# Patient Record
Sex: FEMALE | Race: WHITE | HISPANIC OR LATINO | URBAN - METROPOLITAN AREA
[De-identification: names, ages, dates, MRNs, and addresses within clinical notes are randomized per-mention and may not be internally consistent; named-entity substitution may affect disease eponyms.]

---

## 2017-09-19 ENCOUNTER — OFFICE VISIT - HEALTHEAST (OUTPATIENT)
Dept: SURGERY | Facility: CLINIC | Age: 27
End: 2017-09-19

## 2017-09-19 DIAGNOSIS — E66.01 MORBID OBESITY (H): ICD-10-CM

## 2017-09-19 DIAGNOSIS — E88.819 INSULIN RESISTANCE: ICD-10-CM

## 2017-09-19 DIAGNOSIS — K59.09 CHRONIC CONSTIPATION: ICD-10-CM

## 2017-09-19 DIAGNOSIS — L83 ACANTHOSIS NIGRICANS: ICD-10-CM

## 2017-09-19 DIAGNOSIS — M54.50 LOW BACK PAIN: ICD-10-CM

## 2017-09-19 DIAGNOSIS — G43.909 HEADACHE, MIGRAINE: ICD-10-CM

## 2017-09-19 DIAGNOSIS — E88.810 METABOLIC SYNDROME: ICD-10-CM

## 2017-09-19 DIAGNOSIS — Z83.3 FAMILY HISTORY OF DIABETES MELLITUS TYPE II: ICD-10-CM

## 2017-09-19 DIAGNOSIS — K21.9 GERD (GASTROESOPHAGEAL REFLUX DISEASE): ICD-10-CM

## 2017-09-19 DIAGNOSIS — K76.0 FATTY LIVER: ICD-10-CM

## 2017-09-19 DIAGNOSIS — R74.8 ELEVATED LIVER ENZYMES: ICD-10-CM

## 2017-09-19 DIAGNOSIS — E78.5 DYSLIPIDEMIA: ICD-10-CM

## 2017-09-19 ASSESSMENT — MIFFLIN-ST. JEOR: SCORE: 1527.35

## 2017-10-10 ENCOUNTER — COMMUNICATION - HEALTHEAST (OUTPATIENT)
Dept: SURGERY | Facility: CLINIC | Age: 27
End: 2017-10-10

## 2017-10-27 ENCOUNTER — OFFICE VISIT - HEALTHEAST (OUTPATIENT)
Dept: SURGERY | Facility: CLINIC | Age: 27
End: 2017-10-27

## 2017-10-27 DIAGNOSIS — E66.9 OBESITY (BMI 30-39.9): ICD-10-CM

## 2017-10-27 DIAGNOSIS — Z71.3 DIETARY COUNSELING: ICD-10-CM

## 2017-10-27 ASSESSMENT — MIFFLIN-ST. JEOR: SCORE: 1457.05

## 2017-11-28 ENCOUNTER — AMBULATORY - HEALTHEAST (OUTPATIENT)
Dept: LAB | Facility: CLINIC | Age: 27
End: 2017-11-28

## 2017-11-28 ENCOUNTER — OFFICE VISIT - HEALTHEAST (OUTPATIENT)
Dept: SURGERY | Facility: CLINIC | Age: 27
End: 2017-11-28

## 2017-11-28 DIAGNOSIS — K76.0 FATTY LIVER: ICD-10-CM

## 2017-11-28 DIAGNOSIS — E88.810 METABOLIC SYNDROME: ICD-10-CM

## 2017-11-28 DIAGNOSIS — E66.01 MORBID OBESITY (H): ICD-10-CM

## 2017-11-28 DIAGNOSIS — E88.819 INSULIN RESISTANCE: ICD-10-CM

## 2017-11-28 DIAGNOSIS — E78.5 DYSLIPIDEMIA: ICD-10-CM

## 2017-11-28 DIAGNOSIS — R74.8 ELEVATED LIVER ENZYMES: ICD-10-CM

## 2017-11-28 LAB
FASTING STATUS PATIENT QL REPORTED: NO
HDLC SERPL-MCNC: 42 MG/DL
LDLC SERPL CALC-MCNC: 154 MG/DL

## 2017-11-28 ASSESSMENT — MIFFLIN-ST. JEOR: SCORE: 1425.29

## 2017-11-29 LAB — HBA1C MFR BLD: 5.3 % (ref 4.2–6.1)

## 2017-11-30 ENCOUNTER — AMBULATORY - HEALTHEAST (OUTPATIENT)
Dept: SURGERY | Facility: CLINIC | Age: 27
End: 2017-11-30

## 2017-11-30 ENCOUNTER — COMMUNICATION - HEALTHEAST (OUTPATIENT)
Dept: SURGERY | Facility: CLINIC | Age: 27
End: 2017-11-30

## 2017-11-30 DIAGNOSIS — E55.9 VITAMIN D DEFICIENCY: ICD-10-CM

## 2017-11-30 DIAGNOSIS — E21.3 HYPERPARATHYROIDISM (H): ICD-10-CM

## 2018-01-05 ENCOUNTER — COMMUNICATION - HEALTHEAST (OUTPATIENT)
Dept: SURGERY | Facility: CLINIC | Age: 28
End: 2018-01-05

## 2018-05-25 ENCOUNTER — COMMUNICATION - HEALTHEAST (OUTPATIENT)
Dept: SURGERY | Facility: CLINIC | Age: 28
End: 2018-05-25

## 2018-05-25 ENCOUNTER — AMBULATORY - HEALTHEAST (OUTPATIENT)
Dept: SURGERY | Facility: CLINIC | Age: 28
End: 2018-05-25

## 2018-05-25 DIAGNOSIS — E78.5 DYSLIPIDEMIA (HIGH LDL; LOW HDL): ICD-10-CM

## 2018-05-25 DIAGNOSIS — E55.9 VITAMIN D DEFICIENCY: ICD-10-CM

## 2018-05-25 DIAGNOSIS — E21.3 HYPERPARATHYROIDISM (H): ICD-10-CM

## 2018-06-28 ENCOUNTER — OFFICE VISIT - HEALTHEAST (OUTPATIENT)
Dept: SURGERY | Facility: CLINIC | Age: 28
End: 2018-06-28

## 2018-06-28 DIAGNOSIS — E66.3 OVERWEIGHT: ICD-10-CM

## 2018-06-28 DIAGNOSIS — E66.01 MORBID OBESITY (H): ICD-10-CM

## 2018-06-28 DIAGNOSIS — E88.819 INSULIN RESISTANCE: ICD-10-CM

## 2018-06-28 DIAGNOSIS — E88.810 METABOLIC SYNDROME: ICD-10-CM

## 2018-06-28 DIAGNOSIS — K76.0 FATTY LIVER: ICD-10-CM

## 2018-06-28 ASSESSMENT — MIFFLIN-ST. JEOR: SCORE: 1302.82

## 2020-08-05 ENCOUNTER — OFFICE VISIT - HEALTHEAST (OUTPATIENT)
Dept: SURGERY | Facility: CLINIC | Age: 30
End: 2020-08-05

## 2020-08-05 DIAGNOSIS — E66.01 MORBID OBESITY WITH BMI OF 40.0-44.9, ADULT (H): ICD-10-CM

## 2020-08-05 DIAGNOSIS — E66.3 OVERWEIGHT: ICD-10-CM

## 2020-08-05 DIAGNOSIS — K76.0 FATTY LIVER: ICD-10-CM

## 2020-08-05 DIAGNOSIS — E88.819 INSULIN RESISTANCE: ICD-10-CM

## 2020-08-05 DIAGNOSIS — E88.810 METABOLIC SYNDROME: ICD-10-CM

## 2020-08-05 ASSESSMENT — MIFFLIN-ST. JEOR: SCORE: 1584.05

## 2020-08-05 ASSESSMENT — PATIENT HEALTH QUESTIONNAIRE - PHQ9: SUM OF ALL RESPONSES TO PHQ QUESTIONS 1-9: 9

## 2020-08-06 ENCOUNTER — OFFICE VISIT - HEALTHEAST (OUTPATIENT)
Dept: SURGERY | Facility: CLINIC | Age: 30
End: 2020-08-06

## 2020-08-06 DIAGNOSIS — E66.01 OBESITY, CLASS III, BMI 40-49.9 (MORBID OBESITY) (H): ICD-10-CM

## 2020-08-06 DIAGNOSIS — K76.0 FATTY LIVER: ICD-10-CM

## 2020-08-06 DIAGNOSIS — Z71.3 NUTRITIONAL COUNSELING: ICD-10-CM

## 2020-08-06 ASSESSMENT — MIFFLIN-ST. JEOR: SCORE: 1584.05

## 2020-09-09 ENCOUNTER — COMMUNICATION - HEALTHEAST (OUTPATIENT)
Dept: SURGERY | Facility: CLINIC | Age: 30
End: 2020-09-09

## 2021-01-04 ENCOUNTER — HEALTH MAINTENANCE LETTER (OUTPATIENT)
Age: 31
End: 2021-01-04

## 2021-02-05 ENCOUNTER — OFFICE VISIT - HEALTHEAST (OUTPATIENT)
Dept: SURGERY | Facility: CLINIC | Age: 31
End: 2021-02-05

## 2021-02-05 ENCOUNTER — COMMUNICATION - HEALTHEAST (OUTPATIENT)
Dept: SURGERY | Facility: CLINIC | Age: 31
End: 2021-02-05

## 2021-02-05 DIAGNOSIS — E88.819 INSULIN RESISTANCE: ICD-10-CM

## 2021-02-05 DIAGNOSIS — E88.810 METABOLIC SYNDROME: ICD-10-CM

## 2021-02-05 DIAGNOSIS — E66.3 OVERWEIGHT: ICD-10-CM

## 2021-02-05 DIAGNOSIS — E66.01 MORBID OBESITY (H): ICD-10-CM

## 2021-02-05 RX ORDER — PHENTERMINE HYDROCHLORIDE 37.5 MG/1
TABLET ORAL
Qty: 90 TABLET | Refills: 1 | Status: SHIPPED | OUTPATIENT
Start: 2021-02-05 | End: 2022-06-22

## 2021-02-05 ASSESSMENT — MIFFLIN-ST. JEOR: SCORE: 1511.48

## 2021-02-09 ENCOUNTER — COMMUNICATION - HEALTHEAST (OUTPATIENT)
Dept: SURGERY | Facility: CLINIC | Age: 31
End: 2021-02-09

## 2021-02-09 ENCOUNTER — OFFICE VISIT - HEALTHEAST (OUTPATIENT)
Dept: SURGERY | Facility: CLINIC | Age: 31
End: 2021-02-09

## 2021-02-09 DIAGNOSIS — E66.812 OBESITY, CLASS II, BMI 35-39.9, ISOLATED (SEE ACTUAL BMI): ICD-10-CM

## 2021-02-09 DIAGNOSIS — K76.0 FATTY LIVER: ICD-10-CM

## 2021-02-09 DIAGNOSIS — Z71.3 NUTRITIONAL COUNSELING: ICD-10-CM

## 2021-05-27 ASSESSMENT — PATIENT HEALTH QUESTIONNAIRE - PHQ9: SUM OF ALL RESPONSES TO PHQ QUESTIONS 1-9: 9

## 2021-05-30 ENCOUNTER — RECORDS - HEALTHEAST (OUTPATIENT)
Dept: ADMINISTRATIVE | Facility: CLINIC | Age: 31
End: 2021-05-30

## 2021-05-31 VITALS — HEIGHT: 60 IN | WEIGHT: 197.5 LBS | BODY MASS INDEX: 38.77 KG/M2

## 2021-05-31 VITALS — WEIGHT: 175 LBS | BODY MASS INDEX: 34.36 KG/M2 | HEIGHT: 60 IN

## 2021-05-31 VITALS — WEIGHT: 182 LBS | HEIGHT: 60 IN | BODY MASS INDEX: 35.73 KG/M2

## 2021-06-01 VITALS — HEIGHT: 60 IN | WEIGHT: 148 LBS | BODY MASS INDEX: 29.06 KG/M2

## 2021-06-04 VITALS — BODY MASS INDEX: 41.23 KG/M2 | HEIGHT: 60 IN | WEIGHT: 210 LBS

## 2021-06-04 VITALS — BODY MASS INDEX: 41.23 KG/M2 | WEIGHT: 210 LBS | HEIGHT: 60 IN

## 2021-06-05 VITALS — BODY MASS INDEX: 38.09 KG/M2 | HEIGHT: 60 IN | WEIGHT: 194 LBS

## 2021-06-10 NOTE — PROGRESS NOTES
"Viviana Landin is a 29 y.o. female who is being evaluated via a billable video visit.      The patient has been notified of following:     \"This video visit will be conducted via a call between you and your physician/provider. We have found that certain health care needs can be provided without the need for an in-person physical exam.  This service lets us provide the care you need with a video conversation.  If a prescription is necessary we can send it directly to your pharmacy.  If lab work is needed we can place an order for that and you can then stop by our lab to have the test done at a later time.    Video visits are billed at different rates depending on your insurance coverage. Please reach out to your insurance provider with any questions.    If during the course of the call the physician/provider feels a video visit is not appropriate, you will not be charged for this service.\"    Patient has given verbal consent to a Video visit? Yes  How would you like to obtain your AVS? AVS Preference: MyChart.  If dropped by the video visit, the video invitation should be sent to: Text to cell phone: [phone]  Will anyone else be joining your video visit? No        Video Start Time: 10:45    Additional provider notes:   Bariatric Follow-up    29 y.o.  female BMI:Body mass index is 41.01 kg/m .    Weight:   Wt Readings from Last 1 Encounters:   08/05/20 210 lb (95.3 kg)    pounds  Height: 5' (1.524 m) (8/5/2020 11:00 AM)  Weight: 210 lb (95.3 kg) (8/5/2020 11:00 AM)  BMI (Calculated): 41 (8/5/2020 11:00 AM)      Comorbidities:  Patient Active Problem List   Diagnosis     Migraine Headache     Midback Pain     Fainting (Syncope)     Mood Disorder Of Unknown (Axis III) Etiology     Low back pain     Fatty liver     Dyslipidemia     Elevated liver enzymes     Headache, migraine     Chronic constipation     Metabolic syndrome     Insulin resistance     Acanthosis nigricans     Family history of diabetes " mellitus type II         Interim: Life has been hectic. In 2017 her LDL was high, PTH also high    Plan:  DIET  Will work toward structured meals with meal planning and healthier foods to limit eating out to 2X/wk or less   EXERCISE continue walking 30 minutes daily   PHARMACOTHERAPY restart phentermine and metformin    dietitian visit. Will defer repeat labs. Discuss further next visit. We know LDL was high and D was low     -We reviewed her medications and whether associated with weight gain.  Current Outpatient Medications on File Prior to Visit   Medication Sig Dispense Refill     aspirin-acetaminophen-caffeine (EXCEDRIN MIGRAINE) 250-250-65 mg per tablet Take 1 tablet by mouth every 6 (six) hours as needed for pain.       etonogestreL (NEXPLANON) 68 mg Impl implant Inject 68 mg under the skin.       [DISCONTINUED] phentermine (ADIPEX-P) 37.5 mg tablet Take 1/2 to 1 tablet in the morning. 90 tablet 1     [DISCONTINUED] metFORMIN (GLUCOPHAGE) 500 MG tablet Take 1 tablet (500 mg total) by mouth 2 (two) times a day with meals. 180 tablet prn     No current facility-administered medications on file prior to visit.         We discussed [facility] Bariatric Basics including:  -eating 3 meals daily  -eating protein first  -eating slowly, chewing food well  -avoiding/limiting calorie containing beverages  -choosing wheat, not white with breads, crackers, pastas, jhony, bagels, tortillas, rice  -limiting restaurant or cafeteria eating to twice a week or less  -We discussed the importance of restorative sleep and stress management in maintaining a healthy weight.  -We discussed insulin resistance and glycemic index as it relates to appetite and weight control  -We discussed the National Weight Control Registry healthy weight maintenance strategies and ways to optimize metabolism.  -We discussed the importance of physical activity including cardiovascular and strength training in maintaining a healthier weight and  "explored viable options.    Most recent labs:  Lab Results   Component Value Date    WBC 9.1 11/28/2017    HGB 13.0 11/28/2017    HCT 39.1 11/28/2017    MCV 84 11/28/2017     11/28/2017     Lab Results   Component Value Date    CHOL 226 (H) 02/28/2014     Lab Results   Component Value Date    HDL 42 (L) 11/28/2017     Lab Results   Component Value Date    LDLCALC 149 (H) 02/28/2014     Lab Results   Component Value Date    TRIG 129 02/28/2014     Lab Results   Component Value Date    ALT 28 11/28/2017    AST 19 11/28/2017    ALKPHOS 50 11/28/2017    BILITOT 0.9 11/28/2017     Lab Results   Component Value Date    HGBA1C 5.3 11/28/2017     Lab Results   Component Value Date    SDULGPDP73 526 11/28/2017     Lab Results   Component Value Date    QGGNHWYQ20UN 22.0 (L) 11/28/2017     Lab Results   Component Value Date    FERRITIN 23 11/28/2017     Lab Results   Component Value Date     (H) 11/28/2017     No results found for: 78349  No results found for: 7597  Lab Results   Component Value Date    TSH 0.72 11/28/2017         DIETARY HISTORY  Meals Per Day: 1-2  Eating Protein First?: no  Food Diary: B:coffee with cream L: at her mom's tacos or whatever she cooks, \"nothing good\" D:fast food  Snacks Per Day: a whole lot of snacking  Typical Snack: popcorn, chips, pretzels, cookies  Fluid Intake: coffee and water  Portion Control: problematic  Calorie Containing Beverages: no  Alcohol per week: no  Typical Protein Food Choices: variety  Choosing Whole Grains: no  Grocery Shopping is done by: herself  Food Preparation is done by: herself  Meals at Restaurant/Cafeteria/Take Out Per Week: 15  Eating at the Table: no  TV is Off During Meals: no    Positive Changes Since Last Visit: ready to get back on track  Struggling With: structured meals, food choices, eating out a lot, high stress with son and COVID    Knowledgeable in Reading Food Labels: no  Getting Adequate Protein: likely  Sleeping 7-8 hours/day " yes  Stress management getting outside now    PHYSICAL ACTIVITY PATTERNS:  Cardiovascular: walking 30 minutes most days  Strength Training: no    REVIEW OF SYSTEMS  GENERAL/CONSTITUTIONAL:  Fatigue: yes  CARDIOVASCULAR:  Chest Pain with Exertion: no  PULMONARY:  Dyspnea on exertion: yes  CPAP Use: NA  Tobacco Use: no  GASTROINTESTINAL:  GERD/Heartburn: yes  UROLOGIC:  Urinary Symptoms: no  NEUROLOGIC:  Headaches: no  Paresthesias: no  PSYCHIATRIC:  Moods: up and down  MUSCULOSKELETAL/RHEUMATOLOGIC  Arthralgias: yes  Myalgias: yes  ENDOCRINE:  Monitoring Blood Sugars: no  Sugars Well Controlled: yes  DERMATOLOGIC:  Rashes: no    PHYSICAL EXAM: (most recent vitals and today's stated weight)  Vitals: Ht 5' (1.524 m)   Wt 210 lb (95.3 kg)   BMI 41.01 kg/m    Height: 5' (1.524 m) (8/5/2020 11:00 AM)  Weight: 210 lb (95.3 kg) (8/5/2020 11:00 AM)  BMI (Calculated): 41 (8/5/2020 11:00 AM)      GEN: Pleasant and in no acute distress  PSYCH: A&OX3,     I have reviewed the note as documented above.  This accurately captures the substance of my conversation with the patient.  Thank you for the opportunity to participate in the care of your patient.    Martine Childers MD, FAAFP  [facility]  Diplomate, American Board of Obesity Medicine        Video-Visit Details    Type of service:  Video Visit    Video End Time (time video stopped): 11:23 AM  Originating Location (pt. Location): Home    Distant Location (provider location):  [facility] SURG[facility] C[facility]     Platform used for Video Visit: [facility]      Martine Childers MD

## 2021-06-11 NOTE — TELEPHONE ENCOUNTER
Attempted to reach patient for video visit at 10:30 by sending video link via text and calling. LVM to call back to reschedule at earliest convenience.

## 2021-06-13 NOTE — PROGRESS NOTES
"Non-surgical Weight Loss Initial Diet Evaluation     Assessment:  Pt is a 27 y.o. female being seen today for non-surgical RD nutritional evaluation. Today we reviewed current eating habits and level of physical activity, and instructed on the changes that are required for successful weight loss outcomes.    Personal Goals: Pt would like to lose weight and feel better while learning more about nutrition     Phentermine/Metformin: 1tab/2tabs  +discussed BS management throughout the day and its role in weight loss    Pt's Initial Weight: 197.5 lbs  Weight: 182 lb (82.6 kg)  Weight loss from initial: 15.5  % Weight loss: 7.85 %  BMI: Body mass index is 35.54 kg/(m^2).  IBW: 100 lbs    Estimated RMR (Suffield-St Jeor equation): 1474 calories  Protein requirements (.5grams to .9grams per pound IBW, 20-30% of calories, minimum of 60-80gm per day):  50-90 grams     Food allergies, intolerances, Catholic customs: none    Vitamins/Mineral Supplementation: none    -lost 20lbs with diet and exercise   Biggest struggle with weight loss: \"self control\" - emotional/stress eating - pt states she is doing less of this while on the meds    Who does the grocery shopping for your household? Self and and BF  Who prepares your meals at home? Self and Bf  -lives with BF and 3 children (11, 9, 1)    ++Pt has made a lot of changes to her nutrition NO longer drinking sugar coffee or soda, smaller meal sizes and more protein  Diet Recall/Time: wakes 5am  Breakfast: 7/730 - shake and fruit (greek yogurt, pako and fruit) (9g)   Am Snack: none  Lunch: 12pm - Pro/Veg/CHO - leftovers; OR salad w/ protein OR wendys chicken valarie and fries  Pm snack: P3 (15g)   Dinner: Pro/Veg/CHO   HS Snack: none    Typical Snacks: P3  -more fast food for lunch, vending machine cookies/cupcakes for bfast, larger portions (pizza pasta at dinner) : drinking soda daily     Recommended limiting eating out to no more than 2x/week.  Patient and I reviewed the importance " of eating three consistent meals per day; as well as meal timing to be spaced 4-5 hours apart.  Snack choices: 100-150 calories (1-2x/day if physically hungry), incorporating a fruit/vegetable w/ protein source.    Portion Sizes problematic? yes per patient/diet recall  Encouraged slowing meal times down, 20-30 minutes, chewing to applesauce consistency.   To aid in proper portion control and slow meal time down discussed consuming meals off smaller plates, use toddler/children utensils and set utensils down after each bite.    Protein, vegetables/fruits, carbohydrates:   Reviewed lean protein sources today. Recommended consuming 20-25gm protein at 3 meals daily.  The patient and I discussed the importance of including lean/low fat protein at each meal and limiting carbohydrate intake to less than 25% of plate volume.     Beverages (Type/Oz. per day)  Water: 60oz  Coffee: previously was drinking sugar coffee  Tea: none  Milk: occasional  Regular soda: previously was drinking 2 cans/day  Diet soda: none  Juice: none  Sumeet-Aid/lemonade/etc: none  Alcohol: none    Discussed the importance of adequate hydration and the goal of 64+ oz of fluid daily.   The patient understands the importance of avoiding all alcoholic and sweetened drinks, and instead choosing 64 oz plain water.    Exercise  Just started walking since visiting the dr for 60minutes  Hit and miss with the gym     Pt's understands that 45-60 minutes of daily activity is an important part of weight loss success.   Encouraged pt to incorporate upper body strength training exercise, even if its lifting soup cans while watching tv at night, doing push ups/sit-ups, and abdominal work.    PES statement:    1. (NI-1.3)Excessive energy intake related to Food and nutrition related knowledge deficit concerning excessive energy/oral intake as evidenced by Intake of high caloric density foods at meals and/or snacks; large portions; frequent grazing; Estimated intake that  exceeds estimated daily energy intake; Frequent excessive fast food or restaurant intake; and BMI 35.54    2. (NC-3.3.5) Obese, class III, BMI ?40 related to physical inactivity as evidenced by Infrequent, low-duration and or low intensity physical activity; and Large amounts of sedentary activities; no structured physical activity regimen     Intervention  Discussion:  1. Educated pt on Eat Better, Move More, Live Well: Non-surgical Weight Loss Handout  2. Recommended to consume 15-20gm protein at 3 meals daily. 50-90 grams daily total.  3. Educated pt on food labels: keeping total fat grams <10, total sugar grams <10, fiber >3gm per serving.   4. Reviewed Plate Method and gave food journal homework.  5. Gave food journal homework, to be completed for f/u appointment.  6. Plate Method: The patient and I discussed the importance of including lean/low  fat protein at each meal and limiting carbohydrate intake to less  than 25% of plate volume.  Instructions/Goals:   1. Include 15-20gm protein at each meal.  2. Increase vegetable/fruit intake, by having a vegetable or fruit with each meal daily. Recommended pt to increase vegetable/fruit intake to 4-5 servings daily.  3. Increase fluid intake to 64oz daily: choose plain or calorie/alcohol-free beverages.  4. Incorporate daily structured activity, 45-60 minutes most days of the week  5. Read food labels more consistently: keeping total fat grams <10, total sugar grams <10, fiber >3gm per serving.  6. Practice plate method: 1/2 plate lean/low fat protein source, vegetable/fruit, <25% of plate complex carbohydrates.  7. Practice eating off of smaller plates/bowls, chewing to applesauce consistency, taking 20-30 minutes to eat in a calm/relaxed environment without distractions of tv/email/cell phone.    Handouts Provided:  Eat Better, Move More, Live Well: Non-surgical Weight Loss Handout    Monitor/Evaluation:    Pt will f/u in one month with bariatrician, and f/u in two  months with RD.    Plan for next visit with RD:  GOALS:   1) Focus on protein at meal (15-20g)     Time In: 1:30p  Time Out: 2:15p      ABN signed: Yes
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
## 2021-06-14 NOTE — PROGRESS NOTES
Here for f/u non-surgical weight loss.  Saw the dietitian last month and is taking phentermine as well as Metformin.  See flowsheet.    Jyoti Marina RN, CBN  [facility]  P [phone]  F [phone]

## 2021-06-14 NOTE — PROGRESS NOTES
Bariatric Follow-up    27 y.o.  female BMI:Body mass index is 34.18 kg/(m^2).    Weight:   Wt Readings from Last 1 Encounters:   11/28/17 175 lb (79.4 kg)    pounds  Height: 5' (1.524 m) (11/28/2017  1:44 PM)  Initial Weight: 197.5 lbs (11/28/2017  1:44 PM)  Weight: 175 lb (79.4 kg) (11/28/2017  1:44 PM)  Weight loss from initial: 22.5 (11/28/2017  1:44 PM)  % Weight loss: 11.39 % (11/28/2017  1:44 PM)  BMI (Calculated): 34.2 (11/28/2017  1:44 PM)  SpO2: 97 % (9/19/2017 12:39 PM)  Waist Circumference (In): 42 Inches (9/19/2017 12:39 PM)  Hip Circumference (In): 44 Inches (9/19/2017 12:39 PM)  Neck Circumference (In): 14.5 Inches (9/19/2017 12:39 PM)  NSAIDS: Yes (11/28/2017  1:44 PM)  Pain Scale: 0 (11/28/2017  1:44 PM)    Comorbidities:  Patient Active Problem List   Diagnosis     Obesity     Migraine Headache     Midback Pain     Fainting (Syncope)     Mood Disorder Of Unknown (Axis III) Etiology     Low back pain     Fatty liver     Dyslipidemia     Morbid obesity     Elevated liver enzymes     Headache, migraine     GERD (gastroesophageal reflux disease)     Chronic constipation     Metabolic syndrome     Insulin resistance     Acanthosis nigricans     Family history of diabetes mellitus type II     Vitamin D deficiency     Hyperparathyroidism     Interim: Doing well. Has lost 22# in 2 months through multiple positive lifestyle adjustments.Needs a refill. Didn't have labs drawn yet.    Plan: Refill phentermine. Continue healthy habits. Labs today. Weekly weights. Consistent follow up at intervals to best keep her on track.   -We reviewed her medications and whether associated with weight gain.    We discussed [facility] Bariatric Basics including:  -eating 3 meals daily  -eating protein first  -eating slowly, chewing food well  -avoiding/limiting calorie containing beverages  -choosing wheat, not white with breads, crackers, pastas, jhony, bagels, tortillas, rice  -limiting restaurant or cafeteria eating to twice  a week or less  -We discussed the importance of restorative sleep and stress management in maintaining a healthy weight.  -We discussed insulin resistance and glycemic index as it relates to appetite and weight control  -We discussed the National Weight Control Registry healthy weight maintenance strategies and ways to optimize metabolism.  -We discussed the importance of physical activity including cardiovascular and strength training in maintaining a healthier weight and explored viable options.    Most recent labs:  Lab Results   Component Value Date    WBC 9.1 11/28/2017    HGB 13.0 11/28/2017    HCT 39.1 11/28/2017    MCV 84 11/28/2017     11/28/2017     Lab Results   Component Value Date    CHOL 226 (H) 02/28/2014     Lab Results   Component Value Date    HDL 42 (L) 11/28/2017     Lab Results   Component Value Date    LDLCALC 149 (H) 02/28/2014     Lab Results   Component Value Date    TRIG 129 02/28/2014       Lab Results   Component Value Date    TSH 0.72 11/28/2017       DIETARY HISTORY  Meals Per Day: 2-3  Eating Protein First?: yes  Food Diary: B:eggs boiled or scrambled or smoothies L:salads, soups, [employer], occ D:lasagne  Snacks Per Day: rare  Typical Snack: string cheese, eggs, protein packs  Fluid Intake: intentional  Portion Control: improved  Calorie Containing Beverages: none  Alcohol per week: none  Typical Protein Food Choices: cheese, eggs, chicken, fish, beans, shakes with protein powder  Choosing Whole Grains: yes tries most of the time  Grocery Shopping is done by: she does  Food Preparation is done by: she does  Meals at Restaurant/Cafeteria/Take Out Per Week: 2-3X  Eating at the Table: yes  TV is Off During Meals: yes    Positive Changes Since Last Visit: she has lost 22#  Struggling With: strength training    Knowledgeable in Reading Food Labels: yes  Getting Adequate Protein: yes  Sleeping 7-8 hours/day yes  Stress management OK    PHYSICAL ACTIVITY PATTERNS:  Cardiovascular: walking  every day-1 hour a day  Strength Training: not yet-has a gym membership.     REVIEW OF SYSTEMS  GENERAL/CONSTITUTIONAL:  Fatigue: yes  CARDIOVASCULAR:  Chest Pain with Exertion: no  PULMONARY:  Dyspnea on exertion: yes  CPAP Use: no  Tobacco Use: no  Asthma Controlled: NA  GASTROINTESTINAL:  GERD/Heartburn: yes  Gallbladder:   UROLOGIC:  Urinary Symptoms: USI  NEUROLOGIC:  Headaches: yes  Paresthesias: no  PSYCHIATRIC:  Moods: OK  MUSCULOSKELETAL/RHEUMATOLOGIC  Arthralgias: LBP  Myalgias: yes  ENDOCRINE:  Monitoring Blood Sugars: no  Sugars Well Controlled: yes  DERMATOLOGIC:  Rashes: acanthosis, skin tags    PHYSICAL EXAM:  Vitals: /61  Pulse 71  Resp 16  Ht 5' (1.524 m)  Wt 175 lb (79.4 kg)  BMI 34.18 kg/m2  Height: 5' (1.524 m) (11/28/2017  1:44 PM)  Initial Weight: 197.5 lbs (11/28/2017  1:44 PM)  Weight: 175 lb (79.4 kg) (11/28/2017  1:44 PM)  Weight loss from initial: 22.5 (11/28/2017  1:44 PM)  % Weight loss: 11.39 % (11/28/2017  1:44 PM)  BMI (Calculated): 34.2 (11/28/2017  1:44 PM)  SpO2: 97 % (9/19/2017 12:39 PM)  Waist Circumference (In): 42 Inches (9/19/2017 12:39 PM)  Hip Circumference (In): 44 Inches (9/19/2017 12:39 PM)  Neck Circumference (In): 14.5 Inches (9/19/2017 12:39 PM)  NSAIDS: Yes (11/28/2017  1:44 PM)  Pain Scale: 0 (11/28/2017  1:44 PM)    GEN: Pleasant, well groomed, in no acute disress  EYES: EOMI,  ENT: airway patent  NECK: no carotid bruits, no anterior/supraclavicular lymphadenopathy, thyroid normal   HEART: Rhythm regular, rate regular, no murmur   LUNGS: Clear  ABDOMEN: soft, non-tender, obese, no rashes   VASCULAR: trace bilateral  lower extremity edema  MUSCULOSKELETAL:  muscle mass OK for age  SKIN:  no color changes of venous stasis, no ulcerations    Time spent with patients 30 minutes, >50% in counseling and coordination of care.

## 2021-06-15 NOTE — PROGRESS NOTES
Viviana Landin is a 30 y.o. female who is being evaluated via a billable video visit.       How would you like to obtain your AVS? MyChart.  If dropped from the video visit, the video invitation should be resent by: Send to e-mail at: [email or web address]  Will anyone else be joining your video visit? No     Video Start Time: 3:13 PM      Medical  Weight Loss Follow-Up Diet Evaluation  Assessment:  Viviana is presenting today for a follow up weight management nutrition consultation. Pt has had an initial appointment with Dr. Childers  Weight loss medication: Phentermine and metformin  Pt's Initial Weight: 210 lbs  Weight: 194 lb (88 kg)  Weight loss from initial: 16  % Weight loss: 7.62 %  Weight (Patient Reported): 194 lb (88 kg)  Height (Patient Reported): 5' (1.524 m)  BMI (Based on Pt Reported Ht/Wt): 37.89    BMI: 37.89  Ideal body weight: 45.5 kg (100 lb 4.9 oz)  Adjusted ideal body weight: 62.5 kg (137 lb 12.6 oz)    Estimated RMR (Pope-St Jeor equation):   1500 kcals   Recommended Protein Intake: 60-80 grams of protein/day  Patient Active Problem List:  Patient Active Problem List   Diagnosis     Migraine Headache     Midback Pain     Fainting (Syncope)     Mood Disorder Of Unknown (Axis III) Etiology     Low back pain     Fatty liver     Dyslipidemia     Elevated liver enzymes     Headache, migraine     Chronic constipation     Metabolic syndrome     Insulin resistance     Acanthosis nigricans     Family history of diabetes mellitus type II     Symptomatic cholelithiasis     Obesity (BMI 35.0-39.9) with comorbidity (H)     Progress on goals from last visit: struggling with emotional eating, not as much weight loss as she would have liked. Considering topamax for pop drinking.   +Main issue is finding the motivation to meal prep- hates shopping and cooking.    Nutrition Diagnosis:    Overweight/Obesity (NC 3.3) related to overeating and poor lifestyle habits as evidenced by patient report of  frequent fast food, grazing throughout the day, lack of activity, high calorie beverages and BMI 37.89  Intervention:  1. Nutrition education: discussed various meal planning techniques such as 10 meal rotational menu, theme nights (sent along recipe resources). Also discussed ways to alleviate anxiety when shopping such as shopping online at places like [employer] and picking up the groceries.   2. Nutrition counseling: motivational interviewing    Monitoring/Evaluation:    Goals:  1. Take a look at other ways to meal plan  2. Eliminate soda    Patient to follow up in 1 months(s) with bariatrician    Video-Visit Details    Type of service:  Video Visit    Video End Time (time video stopped): 3:30p  Originating Location (pt. Location): Home    Distant Location (provider location):  [facility] [place]     Platform used for Video Visit: [employer]

## 2021-06-15 NOTE — PROGRESS NOTES
Viviana Landin is 30 y.o.  female who presents for a billable video visit today.    How would you like to obtain your AVS? MyChart.  If dropped from the video visit, the video invitation should be resent by: Send text to [phone]    Will anyone else be joining your video visit? No      Video Start Time: 10:30 AM    Bariatric Follow-up    30 y.o.  female BMI:Body mass index is 37.89 kg/m .    Weight:   Wt Readings from Last 1 Encounters:   02/05/21 194 lb (88 kg)    pounds  Height: 5' (1.524 m) (2/5/2021 10:00 AM)  Initial Weight: 210 lbs (2/5/2021 10:00 AM)  Weight: 194 lb (88 kg) (2/5/2021 10:00 AM)  Weight loss from initial: 16 (2/5/2021 10:00 AM)  % Weight loss: 7.62 % (2/5/2021 10:00 AM)  BMI (Calculated): 37.9 (2/5/2021 10:00 AM)      Comorbidities:  Patient Active Problem List   Diagnosis     Migraine Headache     Midback Pain     Fainting (Syncope)     Mood Disorder Of Unknown (Axis III) Etiology     Low back pain     Fatty liver     Dyslipidemia     Elevated liver enzymes     Headache, migraine     Chronic constipation     Metabolic syndrome     Insulin resistance     Acanthosis nigricans     Family history of diabetes mellitus type II     Symptomatic cholelithiasis         Interim: 16# down since last visit. She is a little disappointed as she had lost 40# before     Plan:  DIET  Continue to work with the dietitian   EXERCISE youtube videos this week, continue to monitor steps   PHARMACOTHERAPY refill phentermine, continue metformin. Topamax may help get rid of soda al together. Restart vitamin D for increased PTH.     f/u with dietitian, stay the course     -We reviewed her medications and whether associated with weight gain.  Current Outpatient Medications on File Prior to Visit   Medication Sig Dispense Refill     aspirin-acetaminophen-caffeine (EXCEDRIN MIGRAINE) 250-250-65 mg per tablet Take 1 tablet by mouth every 6 (six) hours as needed for pain.       etonogestreL (NEXPLANON) 68  mg Impl implant Inject 68 mg under the skin.       metFORMIN (GLUCOPHAGE) 500 MG tablet Take 1 tablet (500 mg total) by mouth 2 (two) times a day with meals. 180 tablet prn     phentermine (ADIPEX-P) 37.5 mg tablet Take 1/2 to 1 tablet in the morning. 90 tablet 1     No current facility-administered medications on file prior to visit.         We discussed [facility] Bariatric Basics including:  -eating 3 meals daily  -eating protein first  -eating slowly, chewing food well  -avoiding/limiting calorie containing beverages  -choosing wheat, not white with breads, crackers, pastas, jhony, bagels, tortillas, rice  -limiting restaurant or cafeteria eating to twice a week or less  -We discussed the importance of restorative sleep and stress management in maintaining a healthy weight.  -We discussed insulin resistance and glycemic index as it relates to appetite and weight control  -We discussed the National Weight Control Registry healthy weight maintenance strategies and ways to optimize metabolism.  -We discussed the importance of physical activity including cardiovascular and strength training in maintaining a healthier weight and explored viable options.    Most recent labs:  Lab Results   Component Value Date    WBC 9.1 11/28/2017    HGB 13.0 11/28/2017    HCT 39.1 11/28/2017    MCV 84 11/28/2017     11/28/2017     Lab Results   Component Value Date    CHOL 226 (H) 02/28/2014     Lab Results   Component Value Date    HDL 42 (L) 11/28/2017     Lab Results   Component Value Date    LDLCALC 149 (H) 02/28/2014     Lab Results   Component Value Date    TRIG 129 02/28/2014     Lab Results   Component Value Date    ALT 28 11/28/2017    AST 19 11/28/2017    ALKPHOS 50 11/28/2017    BILITOT 0.9 11/28/2017     Lab Results   Component Value Date    HGBA1C 5.3 11/28/2017     Lab Results   Component Value Date    TMPXSPDV04 526 11/28/2017     Lab Results   Component Value Date    ADLDBZUS64ZN 22.0 (L) 11/28/2017     Lab  "Results   Component Value Date    FERRITIN 23 11/28/2017     Lab Results   Component Value Date     (H) 11/28/2017       Lab Results   Component Value Date    TSH 0.72 11/28/2017         DIETARY HISTORY  Meals Per Day: 3  Eating Protein First?: yes  Food Diary: B:eggs, veggie patties 2 eggs water L:salmon, asparagus, broccoli D:grilled chicken, rice, broccoli  Snacks Per Day: sometimes  Typical Snack: almonds or gold fish and pretzels, turkey with ritz  Fluid Intake: intentional 2 bottles of water in the morning  Portion Control: improved  Calorie Containing Beverages: regular coke or Dr. Pepper  Alcohol per week: none  Typical Protein Food Choices: variety  Choosing Whole Grains: yes  Grocery Shopping is done by: she does or her mom  Food Preparation is done by: she does or her mom  Meals at Restaurant/Cafeteria/Take Out Per Week: 2-3X/wk  Eating at the Table: sometimes  TV is Off During Meals:     Positive Changes Since Last Visit: less pop, less snacking, more water  Struggling With: eating veggies,     Knowledgeable in Reading Food Labels: yes  Getting Adequate Protein: yes  Sleeping 7-8 hours/day well, 7-8 hours  Stress management high \"insame\" COVID and stress, autistic son, bills, life    PHYSICAL ACTIVITY PATTERNS:  Cardiovascular: walking, Youtube videos for at home workouts  Strength Training: videos    REVIEW OF SYSTEMS  GENERAL/CONSTITUTIONAL:  Fatigue:   CARDIOVASCULAR:  Chest Pain with Exertion: no  PULMONARY:  Dyspnea on exertion: a little  CPAP Use: no  Tobacco Use: no  GASTROINTESTINAL:  GERD/Heartburn:   UROLOGIC:  Urinary Symptoms: no  NEUROLOGIC:  Headaches: yes  Paresthesias: no  PSYCHIATRIC:  Moods: stressed  MUSCULOSKELETAL/RHEUMATOLOGIC  Arthralgias:   Myalgias:   ENDOCRINE:  Monitoring Blood Sugars: no  Sugars Well Controlled: yes  DERMATOLOGIC:  Rashes: no    PHYSICAL EXAM: (most recent vitals and today's stated weight)  Vitals: Ht 5' (1.524 m)   Wt 194 lb (88 kg)   BMI 37.89 kg/m  "   Height: 5' (1.524 m) (2/5/2021 10:00 AM)  Initial Weight: 210 lbs (2/5/2021 10:00 AM)  Weight: 194 lb (88 kg) (2/5/2021 10:00 AM)  Weight loss from initial: 16 (2/5/2021 10:00 AM)  % Weight loss: 7.62 % (2/5/2021 10:00 AM)  BMI (Calculated): 37.9 (2/5/2021 10:00 AM)      GEN: Pleasant and in no acute distress  PSYCH: A&OX3,     I have reviewed the note as documented above.  This accurately captures the substance of my conversation with the patient.  Thank you for the opportunity to participate in the care of your patient.    Martine Childers MD, FAAFP  [facility]  Diplomate, American Board of Obesity Medicine    Total time spent on the date of this encounter doing: chart review, review of test results, patient visit, physical exam, education, counseling, developing plan of care, and documenting = 30 minutes.          Video-Visit Details    Type of service:  Video Visit      Originating Location (pt. Location): Home    Distant Location (provider location):  [facility]     Platform used for Video Visit: Alyssa

## 2021-06-16 PROBLEM — E66.01 MORBID OBESITY (H): Status: ACTIVE | Noted: 2021-02-05

## 2021-06-16 PROBLEM — K80.20 SYMPTOMATIC CHOLELITHIASIS: Status: ACTIVE | Noted: 2021-01-14

## 2021-06-18 NOTE — PROGRESS NOTES
Orders placed to recheck vit D, PTH, CMP and LDL levels that were abnormal 6 months ago and pt instructed to have drawn at [facility].    Jyoti Marina RN, [facility]  P [phone]  F [phone]

## 2021-06-18 NOTE — PROGRESS NOTES
Bariatric Follow-up    27 y.o.  female BMI:Body mass index is 28.9 kg/(m^2).    Weight:   Wt Readings from Last 1 Encounters:   06/28/18 148 lb (67.1 kg)    pounds  Height: 5' (1.524 m) (6/28/2018  7:40 AM)  Initial Weight: 197.5 lbs (6/28/2018  7:40 AM)  Weight: 148 lb (67.1 kg) (6/28/2018  7:40 AM)  Weight loss from initial: 49.5 (6/28/2018  7:40 AM)  % Weight loss: 25.06 % (6/28/2018  7:40 AM)  BMI (Calculated): 28.9 (6/28/2018  7:40 AM)  SpO2: 100 % (6/28/2018  7:40 AM)  Waist Circumference (In): 42 Inches (9/19/2017 12:39 PM)  Hip Circumference (In): 44 Inches (9/19/2017 12:39 PM)  Neck Circumference (In): 14.5 Inches (9/19/2017 12:39 PM)  NSAIDS: Yes (11/28/2017  1:44 PM)  Pain Scale: 0 (11/28/2017  1:44 PM)    Comorbidities:  Patient Active Problem List   Diagnosis     Migraine Headache     Midback Pain     Fainting (Syncope)     Mood Disorder Of Unknown (Axis III) Etiology     Low back pain     Fatty liver     Dyslipidemia     Elevated liver enzymes     Headache, migraine     Chronic constipation     Metabolic syndrome     Insulin resistance     Acanthosis nigricans     Family history of diabetes mellitus type II       Interim: She is 50# down on phentermine and metformin 500BID. Work, money, her 1 yo son recently diagnosed with autism. Her daughter is sneaking food. 10 yo.   Very stressed out.    Plan: PTH, D, CMP and lipid f/u. Refill phentermine and metformin. Continue healthy habits. Dietitian. F/U with me 6 mo. Sooner if problems or concerns.  -We reviewed her medications and whether associated with weight gain.    We discussed [facility] Bariatric Basics including:  -eating 3 meals daily  -eating protein first  -eating slowly, chewing food well  -avoiding/limiting calorie containing beverages  -choosing wheat, not white with breads, crackers, pastas, jhony, bagels, tortillas, rice  -limiting restaurant or cafeteria eating to twice a week or less  -We discussed the importance of restorative sleep and  stress management in maintaining a healthy weight.  -We discussed insulin resistance and glycemic index as it relates to appetite and weight control  -We discussed the National Weight Control Registry healthy weight maintenance strategies and ways to optimize metabolism.  -We discussed the importance of physical activity including cardiovascular and strength training in maintaining a healthier weight and explored viable options.    Most recent labs:  Lab Results   Component Value Date    WBC 9.1 11/28/2017    HGB 13.0 11/28/2017    HCT 39.1 11/28/2017    MCV 84 11/28/2017     11/28/2017     Lab Results   Component Value Date    CHOL 226 (H) 02/28/2014     Lab Results   Component Value Date    HDL 42 (L) 11/28/2017     Lab Results   Component Value Date    LDLCALC 149 (H) 02/28/2014     Lab Results   Component Value Date    TRIG 129 02/28/2014       Lab Results   Component Value Date    ALT 28 11/28/2017    AST 19 11/28/2017    ALKPHOS 50 11/28/2017    BILITOT 0.9 11/28/2017     Lab Results   Component Value Date    HGBA1C 5.3 11/28/2017     Lab Results   Component Value Date    [id number] 526 11/28/2017     Lab Results   Component Value Date    JTSXSXBC15OD 22.0 (L) 11/28/2017     Lab Results   Component Value Date    FERRITIN 23 11/28/2017     Lab Results   Component Value Date     (H) 11/28/2017       Lab Results   Component Value Date    TSH 0.72 11/28/2017     DIETARY HISTORY  Meals Per Day: 3  Eating Protein First?: yes  Food Diary: B:eggs and protein smoothie with green L:was meal prepping, now fast food but small portions D:chicken, broccoli, brown rice  Snacks Per Day: not much  Typical Snack: pistaccios, fruit  Fluid Intake: intentional  Portion Control:  improved  Calorie Containing Beverages: no  Alcohol per week: none  Typical Protein Food Choices: see list  Choosing Whole Grains: yes  Grocery Shopping is done by: she does  Food Preparation is done by: she does  Meals at  Restaurant/Cafeteria/Take Out Per Week: 2-3  Eating at the Table: yes when they can  TV is Off During Meals: yes    Positive Changes Since Last Visit: she maintains a 50# weight los  Struggling With: sleep d/t being busy,     Knowledgeable in Reading Food Labels: yes  Getting Adequate Protein: yes  Sleeping 7-8 hours/day not as well as previously  Stress management exercise    PHYSICAL ACTIVITY PATTERNS:  Cardiovascular: walking and cardio at gym  Strength Training: at the gym    REVIEW OF SYSTEMS  GENERAL/CONSTITUTIONAL:  Fatigue: yes  CARDIOVASCULAR:  Chest Pain with Exertion: no  PULMONARY:  Dyspnea on exertion: no  CPAP Use: no  Tobacco Use: no  Asthma Controlled: NA  GASTROINTESTINAL:  GERD/Heartburn: no  Gallbladder:   UROLOGIC:  Urinary Symptoms: no  NEUROLOGIC:  Headaches: no  Paresthesias: no  PSYCHIATRIC:  Moods: Ok  MUSCULOSKELETAL/RHEUMATOLOGIC  Arthralgias: OK  Myalgias: no  ENDOCRINE:  Monitoring Blood Sugars: no  Sugars Well Controlled: yes  DERMATOLOGIC:  Rashes: no    PHYSICAL EXAM:  Vitals: /64  Pulse 78  Resp 18  Ht 5' (1.524 m)  Wt 148 lb (67.1 kg)  SpO2 100%  Breastfeeding? No  BMI 28.9 kg/m2  Height: 5' (1.524 m) (6/28/2018  7:40 AM)  Initial Weight: 197.5 lbs (6/28/2018  7:40 AM)  Weight: 148 lb (67.1 kg) (6/28/2018  7:40 AM)  Weight loss from initial: 49.5 (6/28/2018  7:40 AM)  % Weight loss: 25.06 % (6/28/2018  7:40 AM)  BMI (Calculated): 28.9 (6/28/2018  7:40 AM)  SpO2: 100 % (6/28/2018  7:40 AM)  Waist Circumference (In): 42 Inches (9/19/2017 12:39 PM)  Hip Circumference (In): 44 Inches (9/19/2017 12:39 PM)  Neck Circumference (In): 14.5 Inches (9/19/2017 12:39 PM)  NSAIDS: Yes (11/28/2017  1:44 PM)  Pain Scale: 0 (11/28/2017  1:44 PM)    GEN: Pleasant, well groomed, in no acute distress  EYES: EOMI,  ENT: airway patent  NECK: no carotid bruits, no anterior/supraclavicular lymphadenopathy, thyroid normal   HEART: Rhythm regular, rate regular, no murmur   LUNGS: Clear  ABDOMEN:  soft, non-tender, obese, no rashes   VASCULAR:  no  lower extremity edema  MUSCULOSKELETAL:  muscle mass OK for age  SKIN:  no color changes of venous stasis, no ulcerations    Blood glucose 104.     Time spent with patients 30 minutes, >50% in counseling and coordination of care.

## 2021-07-03 NOTE — ADDENDUM NOTE
Addendum Note by Martine Pacheco MD at 11/28/2017  2:10 PM     Author: Martine Pacheco MD Service: -- Author Type: Physician    Filed: 11/28/2017  2:10 PM Encounter Date: 9/19/2017 Status: Signed    : Martine Pacheco MD (Physician)    Addended by: MARTINE PACHECO on: 11/28/2017 02:10 PM        Modules accepted: Orders

## 2021-08-03 PROBLEM — E21.3 HYPERPARATHYROIDISM (H): Status: RESOLVED | Noted: 2017-11-30 | Resolved: 2018-06-28

## 2021-10-11 ENCOUNTER — HEALTH MAINTENANCE LETTER (OUTPATIENT)
Age: 31
End: 2021-10-11

## 2022-01-30 ENCOUNTER — HEALTH MAINTENANCE LETTER (OUTPATIENT)
Age: 32
End: 2022-01-30

## 2022-06-22 ENCOUNTER — VIRTUAL VISIT (OUTPATIENT)
Dept: SURGERY | Facility: CLINIC | Age: 32
End: 2022-06-22
Payer: COMMERCIAL

## 2022-06-22 VITALS — HEIGHT: 68 IN | WEIGHT: 203 LBS | BODY MASS INDEX: 30.77 KG/M2

## 2022-06-22 DIAGNOSIS — E66.3 OVERWEIGHT: ICD-10-CM

## 2022-06-22 DIAGNOSIS — E88.819 INSULIN RESISTANCE: ICD-10-CM

## 2022-06-22 DIAGNOSIS — E88.810 METABOLIC SYNDROME: ICD-10-CM

## 2022-06-22 PROCEDURE — 99214 OFFICE O/P EST MOD 30 MIN: CPT | Mod: GT | Performed by: FAMILY MEDICINE

## 2022-06-22 RX ORDER — PHENTERMINE HYDROCHLORIDE 37.5 MG/1
TABLET ORAL
Qty: 90 TABLET | Refills: 1 | Status: SHIPPED | OUTPATIENT
Start: 2022-06-22 | End: 2022-12-27

## 2022-06-22 NOTE — PROGRESS NOTES
Viviana Dumont is 31 year old  female who presents for a billable video visit today.    How would you like to obtain your AVS? MyChart  If dropped from the video visit, the video invitation should be resent by: Text to cell phone: [phone]   Will anyone else be joining your video visit? No      Video Start Time: 2:49pm    Are there any specific questions or needs that you would like addressed at your visit today? Return MWM - unsure of last visit    Bariatric Follow-up    31 year old  female BMI:Body mass index is 30.87 kg/m .    Weight:   Wt Readings from Last 1 Encounters:   06/22/22 92.1 kg (203 lb)    pounds    Comorbidities:  Patient Active Problem List   Diagnosis     Migraine Headache     Midback Pain     Fainting (Syncope)     Mood Disorder Of Unknown (Axis III) Etiology     Low back pain     Fatty liver     Dyslipidemia     Elevated liver enzymes     Headache, migraine     Chronic constipation     Metabolic syndrome     Insulin resistance     Acanthosis nigricans     Family history of diabetes mellitus type II     Symptomatic cholelithiasis     Obesity (BMI 35.0-39.9) with comorbidity (H)         Interim: Remote intro visit was grieving her grandmother's death then the diagnosis of her son's autism at age 1. Focused on her mental health over the past year and ready now to focus on wellness. Was 220# and lost 17# down to 203#. Had some phentermine, stopped drinking pop. Not taking the metformin. Does not like how it makes her feel.    from the father of her baby recently. Living with her parents. Not eating healthy foods but not eating a lot of it.     Plan:  DIET  Will work toward 3 meals, healthier choices   EXERCISE as able   PHARMACOTHERAPY continue phentermine. Can add Topiramate, Naltrexone. Discussed GLP-1 Jose M    Congratulated on recent 17# weight loss. Dietitian at earliest convenience. , 24 week program would be excellent.      -We reviewed her medications and whether  associated with weight gain.    Current Outpatient Medications:      aspirin-acetaminophen-caffeine (EXCEDRIN MIGRAINE) 250-250-65 mg per tablet, [ASPIRIN-ACETAMINOPHEN-CAFFEINE (EXCEDRIN MIGRAINE) 250-250-65 MG PER TABLET] Take 1 tablet by mouth every 6 (six) hours as needed for pain., Disp: , Rfl:      etonogestreL (NEXPLANON) 68 mg Impl implant, [ETONOGESTREL (NEXPLANON) 68 MG IMPL IMPLANT] Inject 68 mg under the skin., Disp: , Rfl:      phentermine (ADIPEX-P) 37.5 MG tablet, [PHENTERMINE (ADIPEX-P) 37.5 MG TABLET] Take 1/2 to 1 tablet in the morning., Disp: 90 tablet, Rfl: 1      We discussed [facility] Basics including:  -eating 3 meals daily  -eating protein first  -eating slowly, chewing food well  -avoiding/limiting calorie containing beverages  -choosing wheat, not white with breads, crackers, pastas, jhony, bagels, tortillas, rice  -limiting restaurant or cafeteria eating to twice a week or less  -We discussed the importance of restorative sleep and stress management in maintaining a healthy weight.  -We discussed insulin resistance and glycemic index as it relates to appetite and weight control  -We discussed the National Weight Control Registry healthy weight maintenance strategies and ways to optimize metabolism.  -We discussed the importance of physical activity including cardiovascular and strength training in maintaining a healthier weight and explored viable options.    Most recent labs:    Lab Results   Component Value Date    CHOL 226 (H) 02/28/2014     Lab Results   Component Value Date    HDL 42 (L) 11/28/2017     No components found for: LDLCALC  Lab Results   Component Value Date    TRIG 129 02/28/2014       DIETARY HISTORY  Meals Per Day: 2-3  Eating Protein First?: yes  Food Diary: B:eggs with lim, ham L:meat with rice, beans,  D:same  Snacks Per Day: yes  Typical Snack:   Fluid Intake: coffee with brown sugar and half and half  Portion Control: improved  Calorie Containing  "Beverages: no  Alcohol per week: no  Typical Protein Food Choices: variety  Choosing Whole Grains:   Grocery Shopping is done by: herself  Food Preparation is done by: herself  Meals at Restaurant/Cafeteria/Take Out Per Week: 2-3  Eating at the Table:   TV is Off During Meals:     Positive Changes Since Last Visit: smaller portions  Struggling With: exercise    Knowledgeable in Reading Food Labels:   Getting Adequate Protein: yes  Sleeping 7-8 hours/day 8 hours  Stress management doing therapy and journaling    PHYSICAL ACTIVITY PATTERNS:  Cardiovascular: none  Strength Training: none    REVIEW OF SYSTEMS  GENERAL/CONSTITUTIONAL:  Fatigue: yes  CARDIOVASCULAR:  Chest Pain with Exertion: no  PULMONARY:  Dyspnea on exertion: no  CPAP Use: no  Tobacco Use: no  GASTROINTESTINAL:  GERD/Heartburn: no  UROLOGIC:  Urinary Symptoms: no  NEUROLOGIC:  Headaches: migraine  Paresthesias: no  PSYCHIATRIC:  Moods: stable  MUSCULOSKELETAL/RHEUMATOLOGIC  Arthralgias: no  Myalgias: no  ENDOCRINE:  Monitoring Blood Sugars: no  Sugars Well Controlled: yes  DERMATOLOGIC:  Rashes: no    PHYSICAL EXAM: (most recent vitals and today's stated weight)  Vitals: Ht 1.727 m (5' 8\")   Wt 92.1 kg (203 lb)   BMI 30.87 kg/m        GEN: Pleasant and in no acute distress  PSYCH: A&OX3,     I have reviewed the note as documented above.  This accurately captures the substance of my conversation with the patient.  Thank you for the opportunity to participate in the care of your patient.    Martine Childers MD, FAAFP  [facility]  Diplomate, American Board of Obesity Medicine    Total time spent on the date of this encounter doing: chart review, review of test results, patient visit, physical exam, education, counseling, developing plan of care, and documenting = 30 minutes.          Video-Visit Details    Type of service:  Video Visit    Video End Time (time video stopped): 3:19  Originating Location (pt. Location): " Home    Distant Location (provider location):  [facility] [place]     Platform used for Video Visit: Katelynn

## 2022-06-22 NOTE — LETTER
6/22/2022         RE: Viviana Dumont  [address] [facility] [address]        Dear Colleague,    Thank you for referring your patient, Viviana Dumont, to the [facility] [place]. Please see a copy of my visit note below.    Viviana Dumont is 31 year old  female who presents for a billable video visit today.    How would you like to obtain your AVS? MyChart  If dropped from the video visit, the video invitation should be resent by: Text to cell phone: [phone]   Will anyone else be joining your video visit? No      Video Start Time: 2:49pm    Are there any specific questions or needs that you would like addressed at your visit today? Return MWM - unsure of last visit    Bariatric Follow-up    31 year old  female BMI:Body mass index is 30.87 kg/m .    Weight:   Wt Readings from Last 1 Encounters:   06/22/22 92.1 kg (203 lb)    pounds    Comorbidities:  Patient Active Problem List   Diagnosis     Migraine Headache     Midback Pain     Fainting (Syncope)     Mood Disorder Of Unknown (Axis III) Etiology     Low back pain     Fatty liver     Dyslipidemia     Elevated liver enzymes     Headache, migraine     Chronic constipation     Metabolic syndrome     Insulin resistance     Acanthosis nigricans     Family history of diabetes mellitus type II     Symptomatic cholelithiasis     Obesity (BMI 35.0-39.9) with comorbidity (H)         Interim: Remote intro visit was grieving her grandmother's death then the diagnosis of her son's autism at age 1. Focused on her mental health over the past year and ready now to focus on wellness. Was 220# and lost 17# down to 203#. Had some phentermine, stopped drinking pop. Not taking the metformin. Does not like how it makes her feel.    from the father of her baby recently. Living with her parents. Not eating healthy foods but not eating a lot of it.     Plan:  DIET  Will work toward 3 meals,  healthier choices   EXERCISE as able   PHARMACOTHERAPY continue phentermine. Can add Topiramate, Naltrexone. Discussed GLP-1 Jose M    Congratulated on recent 17# weight loss. Dietitian at earliest convenience. , 24 week program would be excellent.      -We reviewed her medications and whether associated with weight gain.    Current Outpatient Medications:      aspirin-acetaminophen-caffeine (EXCEDRIN MIGRAINE) 250-250-65 mg per tablet, [ASPIRIN-ACETAMINOPHEN-CAFFEINE (EXCEDRIN MIGRAINE) 250-250-65 MG PER TABLET] Take 1 tablet by mouth every 6 (six) hours as needed for pain., Disp: , Rfl:      etonogestreL (NEXPLANON) 68 mg Impl implant, [ETONOGESTREL (NEXPLANON) 68 MG IMPL IMPLANT] Inject 68 mg under the skin., Disp: , Rfl:      phentermine (ADIPEX-P) 37.5 MG tablet, [PHENTERMINE (ADIPEX-P) 37.5 MG TABLET] Take 1/2 to 1 tablet in the morning., Disp: 90 tablet, Rfl: 1      We discussed [facility] Bariatric Basics including:  -eating 3 meals daily  -eating protein first  -eating slowly, chewing food well  -avoiding/limiting calorie containing beverages  -choosing wheat, not white with breads, crackers, pastas, jhony, bagels, tortillas, rice  -limiting restaurant or cafeteria eating to twice a week or less  -We discussed the importance of restorative sleep and stress management in maintaining a healthy weight.  -We discussed insulin resistance and glycemic index as it relates to appetite and weight control  -We discussed the National Weight Control Registry healthy weight maintenance strategies and ways to optimize metabolism.  -We discussed the importance of physical activity including cardiovascular and strength training in maintaining a healthier weight and explored viable options.    Most recent labs:    Lab Results   Component Value Date    CHOL 226 (H) 02/28/2014     Lab Results   Component Value Date    HDL 42 (L) 11/28/2017     No components found for: LDLCALC  Lab Results   Component Value Date    TRIG 129  "02/28/2014       DIETARY HISTORY  Meals Per Day: 2-3  Eating Protein First?: yes  Food Diary: B:eggs with lim, ham L:meat with rice, beans,  D:same  Snacks Per Day: yes  Typical Snack:   Fluid Intake: coffee with brown sugar and half and half  Portion Control: improved  Calorie Containing Beverages: no  Alcohol per week: no  Typical Protein Food Choices: variety  Choosing Whole Grains:   Grocery Shopping is done by: herself  Food Preparation is done by: herself  Meals at Restaurant/Cafeteria/Take Out Per Week: 2-3  Eating at the Table:   TV is Off During Meals:     Positive Changes Since Last Visit: smaller portions  Struggling With: exercise    Knowledgeable in Reading Food Labels:   Getting Adequate Protein: yes  Sleeping 7-8 hours/day 8 hours  Stress management doing therapy and journaling    PHYSICAL ACTIVITY PATTERNS:  Cardiovascular: none  Strength Training: none    REVIEW OF SYSTEMS  GENERAL/CONSTITUTIONAL:  Fatigue: yes  CARDIOVASCULAR:  Chest Pain with Exertion: no  PULMONARY:  Dyspnea on exertion: no  CPAP Use: no  Tobacco Use: no  GASTROINTESTINAL:  GERD/Heartburn: no  UROLOGIC:  Urinary Symptoms: no  NEUROLOGIC:  Headaches: migraine  Paresthesias: no  PSYCHIATRIC:  Moods: stable  MUSCULOSKELETAL/RHEUMATOLOGIC  Arthralgias: no  Myalgias: no  ENDOCRINE:  Monitoring Blood Sugars: no  Sugars Well Controlled: yes  DERMATOLOGIC:  Rashes: no    PHYSICAL EXAM: (most recent vitals and today's stated weight)  Vitals: Ht 1.727 m (5' 8\")   Wt 92.1 kg (203 lb)   BMI 30.87 kg/m        GEN: Pleasant and in no acute distress  PSYCH: A&OX3,     I have reviewed the note as documented above.  This accurately captures the substance of my conversation with the patient.  Thank you for the opportunity to participate in the care of your patient.    Martine Childers MD, FAAFP  [facility]  Diplomate, American Board of Obesity Medicine    Total time spent on the date of this encounter doing: chart review, " review of test results, patient visit, physical exam, education, counseling, developing plan of care, and documenting = 30 minutes.          Video-Visit Details    Type of service:  Video Visit    Video End Time (time video stopped): 3:19  Originating Location (pt. Location): Home    Distant Location (provider location):  [facility]ATRICS [facility] [place]     Platform used for Video Visit: Katelynn      Again, thank you for allowing me to participate in the care of your patient.        Sincerely,        Martine Childers MD

## 2022-06-22 NOTE — PATIENT INSTRUCTIONS
[facility] Bas[facility]    Remember to:    -Eat 3 meals a day (not 2, not 5) Chew your food well/SLOW down  -Eat your protein first  -Be a water drinker/Minize liquid calories (no regular pop, no juice) skim or 1% milk OK  -Sleep 7-8 hours each night. Address sleep if problematic  -Stress management is important. Address if problematic  -Move-8000 steps daily Muscle: maintain your muscle mass (strength training 2X/wk)  -Wheat, not white (bread, pasta, crackers, jhony, bagels, tortillas, rice)  -Limit restaurant, cafeteria, take out, drive through to 2 times per week or less  -Minimize caffeine, alcohol, and night-time snacking  -Consider keeping a food diary (i.e. My Fitness Pal, Lose It, or other food tracker)  -Follow up with the dietitian      **Some lean proteins: chicken, turkey, tuna, salmon, crab, fish, shrimp, scallops, lobster, lean cuts of beef and pork, luncheon meats, veggie burgers, beans (black, lima, garbanzo, garcia, kidney, refried), chile, cottage cheese, string cheese, other cheese, eggs, tofu, peanut butter, nuts, vegan crumbles, greek yogurt     MEDICATIONS FOR WEIGHT LOSS    PHENTERMINE (Adipex): approved in 1959 for appetite suppression.  It has stimulant effects and cannot be used with Ritalin, Concerta, or other stimulants.  It is not addictive although it's chemically related to amphetamines.  Amphetamines are addictive. The most common side effects are dry mouth, increased energy and concentration, increased pulse, and constipation.  You should not take phentermine if you have glaucoma, hyperthyroidism, or uncontrolled/untreated hypertension.  $24-$30 for 90 tablets    PHENDIMETRAZINE (Bontril): Appetite suppressant/sympathomimetic.  Controlled substance.  Side effects and contraindications similar to phentermine.  $45-$60 for 3 month supply    TOPIRAMATE (Topamax): Anti-seizure medication, also used to prevent migraines.  Side effects include paresthesia, glaucoma, altered  concentration, attention difficulties, memory and speech problems, metabolic acidosis, depression, increase in body temperature and decrease sweating, kidney stones, and weight loss.  Do not take Topamax while taking Depakote as this can cause high ammonia levels.  You must have reliable birth control as Topamax can cause birth defects.  Discontinue slowly to avoid seizure.  Insurance usually covers Topiramate.    QSYMIA (Phentermine + Topamax):  See above information about phentermine and Topamax.  Most common side effects are paresthesia, dizziness, distortion of taste, insomnia, constipation, and dry mouth.  $150-$220 per month    DIETHYLPROPION (Tenuate): Sympathomimetic amine.  Appetite suppressant.  Doses 25 mg before meals or 75 mg per day.  Most common side effects are hypertension, palpitations, EKG changes, and increased seizures in epileptics.  There can be a possible adverse reaction with alcohol.  $70-$90 per 3 months    XENICAL(Orlistat) (Bob OTC): Approved in 1999.  A fat-blocker.  It reduces absorption of fat by approximately 30%.  It has beneficial effects on lipid levels.  Side effects include diarrhea, abdominal cramping, fecal incontinence, oily spotting, and flatus with discharge.  Side effects are minimized if the patient limits their dietary fat to no more than 30% of their diet.  Patients must take a multivitamin daily to avoid vitamin D, E, A, and K deficiency.  $120 per month    CONTRAVE (Naltrexone/Bupropion): Approved in 2014.  It is a combination pill including an opioid receptor blocker and a long-standing antidepressant.  Most common side effects include nausea, constipation, headache, vomiting, dizziness, trouble sleeping, dry mouth, and diarrhea.  With all antidepressants watch for mood changes and suicide ideation.  Bupropion has been known to lower the seizure threshold in those prone to seizures.  It should not be used in a patient with a recent history of bulimia. It has been  associated with liver damage from taking higher than recommended doses.  Do not use countrave if you have taken opioid medications or opioid street drugs in the past 7-10 days, if you are currently on opioids, methadone, or if you are pregnant.  Do not use contrave if you have recently stopped using alcohol or benzodiazepines.  Taper off contrave slowly.  Dosing: titrate up to 2 tablets twice daily of the Naltrexone 8 mg/ Bupropion 90 mg tablets.  $200 for 90 tablets    SAXENDA (Liraglutide): A daily injectable (3mg daily) medication used for type 2 diabetes (Victoza). Glucagon-like peptide-1 (GLP-1) agonist. Contraindications include personal or family history of medullary thyroid cancer or MEN type 2. Acute pancreatitis has been observed in patients taking liraglutide. Liraglutide causes C-cell tumors in rats and mice. It is unknown whether liraglutide causes tumors in humans. Start at 0.6mg, increasing the dose weekly up to 3mg.     VYVANSE (Lisdexamfetamine dimesylate): a CNS stimulant used to treat ADHD. Indicated for the treatment of moderate to severe Binge Eating Disorder in Adults. Contraindicated in patients with known heart disease, structural abnormalities of the heart, serious heart arrhythmias or unexplained syncope. CNS stimulants such as vyvanse may cause manic or psychotic symptoms in patients with BPAD or pre-existing psychosis. Use with caution in patients with Raynaud's phenomenon. Most common side effects include dry mouth, insomnia, decreased appetite, increased heart rate, jittery feeling, constipation and anxiety.     WEGOVY (Semaglutide): A weekly injectable (2.4mg weekly) medication used for type 2 diabetes (Ozempic). Glucagon-like peptide-1 (GLP-1) agonist. Contraindications include personal or family history of medullary thyroid cancer or MEN type 2. Acute pancreatitis has been observed in patients taking Semaglutide. Semaglutide causes C-cell tumors in rats and mice. It is unknown  whether Semaglutide causes tumors in humans. Start at 0.25mg, increasing to 0.5, 1.0, 1.7 then 2.4 monthly.     MOUNJARO (Tirzepatide): A weekly injectable medication indicated for type 2 diabetes. Glucagon-like-peptide-1 (GLP-1) agonist and Glucose-Dependent Insulinotropic Polypeptide (GIP) agonist. Contraindications include personal or family history of medullary thyroid cancer or MEN type 2. Acute pancreatitis has been observed in patients taking Tirzepatide. Tirzepatide causes C-cell tumors in rats and mice. It is unknown whether Tirzepatide causes tumors in humans. Watch for neck mass, difficulty swallowing, persistent hoarseness, and epigastric abdominal pain. Most common side effects include nausea, diarrhea, vomiting, constipation, dyspepsia, and abdominal pain. Start at 0.25mg, increasing to 0.5, 7.5, 10, 12.5 then 15mg monthly.

## 2022-07-25 ENCOUNTER — TELEPHONE (OUTPATIENT)
Dept: SURGERY | Facility: CLINIC | Age: 32
End: 2022-07-25

## 2022-07-25 NOTE — TELEPHONE ENCOUNTER
Sent video link via text and attempted to call for video visit at 10:00a. Left a detailed message encouraging patient to call to reschedule when able.

## 2022-09-15 ENCOUNTER — OFFICE VISIT (OUTPATIENT)
Dept: SURGERY | Facility: CLINIC | Age: 32
End: 2022-09-15
Payer: COMMERCIAL

## 2022-09-15 VITALS
DIASTOLIC BLOOD PRESSURE: 70 MMHG | WEIGHT: 186.6 LBS | HEIGHT: 60 IN | BODY MASS INDEX: 36.63 KG/M2 | SYSTOLIC BLOOD PRESSURE: 104 MMHG

## 2022-09-15 DIAGNOSIS — K76.0 FATTY LIVER: ICD-10-CM

## 2022-09-15 DIAGNOSIS — E88.810 METABOLIC SYNDROME: ICD-10-CM

## 2022-09-15 DIAGNOSIS — E66.01 MORBID OBESITY (H): Primary | ICD-10-CM

## 2022-09-15 PROCEDURE — 99214 OFFICE O/P EST MOD 30 MIN: CPT | Performed by: FAMILY MEDICINE

## 2022-09-15 NOTE — LETTER
9/15/2022         RE: Viviana Dumont  [address] [facility] [address]        Dear Colleague,    Thank you for referring your patient, Viviana Dumont, to the [facility] AND [facility]ATRICS CARE [place]. Please see a copy of my visit note below.    Bariatric Follow-up    31 year old  female BMI:Body mass index is 36.44 kg/m .    Weight:   Wt Readings from Last 1 Encounters:   09/15/22 84.6 kg (186 lb 9.6 oz)    pounds      Comorbidities:  Patient Active Problem List   Diagnosis     Migraine Headache     Midback Pain     Fainting (Syncope)     Mood Disorder Of Unknown (Axis III) Etiology     Low back pain     Fatty liver     Dyslipidemia     Elevated liver enzymes     Headache, migraine     Chronic constipation     Metabolic syndrome     Insulin resistance     Acanthosis nigricans     Family history of diabetes mellitus type II     Symptomatic cholelithiasis     Obesity (BMI 35.0-39.9) with comorbidity (H)         Interim: 11# weight loss. Met with Khushi and started walking. Drinking a lot of water. Smaller portions but not different foods. Eating up to 6 eggs a day (2 with rice and 3 with salt and lime)    Plan:  DIET  3 meals, protein first, more veggies, wheat not white   EXERCISE continue taking a walk a day-this is a known treatment for liver steatosis.    PHARMACOTHERAPY continue phentermine Consider GLP01 RA    Congratulated on decreasing portion sizes. Working with the dietitian will be helpful to introduce balance/fruits/veggies/lean proteins, variety in to her diet     -We reviewed her medications and whether associated with weight gain.  Current Outpatient Medications   Medication     aspirin-acetaminophen-caffeine (EXCEDRIN MIGRAINE) 250-250-65 mg per tablet     phentermine (ADIPEX-P) 37.5 MG tablet     No current facility-administered medications for this visit.        We discussed [facility] Bariatric Basics including:  -eating 3 meals  daily  -eating protein first  -eating slowly, chewing food well  -avoiding/limiting calorie containing beverages  -choosing wheat, not white with breads, crackers, pastas, jhony, bagels, tortillas, rice  -limiting restaurant or cafeteria eating to twice a week or less  -We discussed the importance of restorative sleep and stress management in maintaining a healthy weight.  -We discussed insulin resistance and glycemic index as it relates to appetite and weight control  -We discussed the National Weight Control Registry healthy weight maintenance strategies and ways to optimize metabolism.  -We discussed the importance of physical activity including cardiovascular and strength training in maintaining a healthier weight and explored viable options.    Most recent labs:    Lab Results   Component Value Date    CHOL 226 (H) 02/28/2014     Lab Results   Component Value Date    HDL 42 (L) 11/28/2017       Lab Results   Component Value Date    TRIG 129 02/28/2014       DIETARY HISTORY  Meals Per Day: 2-3  Eating Protein First?:   Food Diary: B:coffee and bagel L:whatever her mom makes, rice with eggs, chicken breasts breaded with rice, spaghetti D:chipoltle steak bowl  Snacks Per Day: no  Typical Snack: no  Fluid Intake: intentional  Portion Control: improved  Calorie Containing Beverages: no  Alcohol per week: no  Typical Protein Food Choices: variety  Choosing Whole Grains: no  Grocery Shopping is done by: shared.  Food Preparation is done by: her mom  Meals at Restaurant/Cafeteria/Take Out Per Week: 1/wk  Eating at the Table: occ  TV is Off During Meals: occ    Positive Changes Since Last Visit: more water, smaller portions  Struggling With: changing to healthy foods    Knowledgeable in Reading Food Labels: no  Getting Adequate Protein: yes  Sleeping 7-8 hours/day 7-8 hours  Stress management OK    PHYSICAL ACTIVITY PATTERNS:  Cardiovascular: walking now outside 30 min. With her dog-this month so 3-4 Xlwk  Strength  Training: not yet    REVIEW OF SYSTEMS  GENERAL/CONSTITUTIONAL:  Fatigue: yes  CARDIOVASCULAR:  Chest Pain with Exertion: no  PULMONARY:  Dyspnea on exertion: yes  CPAP Use: no  Tobacco Use: no  Asthma Controlled: NA  GASTROINTESTINAL:  GERD/Heartburn: managed  Gallbladder:   UROLOGIC:  Urinary Symptoms: no  NEUROLOGIC:  Headaches:no  Paresthesias: no  PSYCHIATRIC:  Moods: euthymic-better, more calm  MUSCULOSKELETAL/RHEUMATOLOGIC  Arthralgias: Ok  Myalgias: OK  ENDOCRINE:  Monitoring Blood Sugars: no  Sugars Well Controlled: yes  DERMATOLOGIC:  Rashes: no    PHYSICAL EXAM:  Vitals: /70   Ht 1.524 m (5')   Wt 84.6 kg (186 lb 9.6 oz)   BMI 36.44 kg/m    [unfilled]    GEN: Pleasant, well groomed, in no acute distress  EYES: EOMI,  ENT: airway patent  NECK: no carotid bruits, no anterior/supraclavicular lymphadenopathy, thyroid normal   HEART: Rhythm regular, rate regular, no murmur   LUNGS: Clear  ABDOMEN: soft, non-tender, obese, no rashes   VASCULAR: no  lower extremity edema  MUSCULOSKELETAL:  muscle mass OK  SKIN:  no color changes of venous stasis, no ulcerations    Total time spent on the date of this encounter doing: chart review, review of test results, patient visit, physical exam, education, counseling, developing plan of care, and documenting = 30 minutes.              Again, thank you for allowing me to participate in the care of your patient.        Sincerely,        Martine Childers MD

## 2022-09-15 NOTE — PATIENT INSTRUCTIONS
MEDICATIONS FOR WEIGHT LOSS    PHENTERMINE (Adipex): approved in 1959 for appetite suppression.  It has stimulant effects and cannot be used with Ritalin, Concerta, or other stimulants.  It is not addictive although it's chemically related to amphetamines.  Amphetamines are addictive. The most common side effects are dry mouth, increased energy and concentration, increased pulse, and constipation.  You should not take phentermine if you have glaucoma, hyperthyroidism, or uncontrolled/untreated hypertension.  $24-$30 for 90 tablets    PHENDIMETRAZINE (Bontril): Appetite suppressant/sympathomimetic.  Controlled substance.  Side effects and contraindications similar to phentermine.  $45-$60 for 3 month supply    TOPIRAMATE (Topamax): Anti-seizure medication, also used to prevent migraines.  Side effects include paresthesia, glaucoma, altered concentration, attention difficulties, memory and speech problems, metabolic acidosis, depression, increase in body temperature and decrease sweating, kidney stones, and weight loss.  Do not take Topamax while taking Depakote as this can cause high ammonia levels.  You must have reliable birth control as Topamax can cause birth defects.  Discontinue slowly to avoid seizure.  Insurance usually covers Topiramate.    QSYMIA (Phentermine + Topamax):  See above information about phentermine and Topamax.  Most common side effects are paresthesia, dizziness, distortion of taste, insomnia, constipation, and dry mouth.  $150-$220 per month    DIETHYLPROPION (Tenuate): Sympathomimetic amine.  Appetite suppressant.  Doses 25 mg before meals or 75 mg per day.  Most common side effects are hypertension, palpitations, EKG changes, and increased seizures in epileptics.  There can be a possible adverse reaction with alcohol.  $70-$90 per 3 months    XENICAL(Orlistat) (Bob OTC): Approved in 1999.  A fat-blocker.  It reduces absorption of fat by approximately 30%.  It has beneficial effects on  lipid levels.  Side effects include diarrhea, abdominal cramping, fecal incontinence, oily spotting, and flatus with discharge.  Side effects are minimized if the patient limits their dietary fat to no more than 30% of their diet.  Patients must take a multivitamin daily to avoid vitamin D, E, A, and K deficiency.  $120 per month    CONTRAVE (Naltrexone/Bupropion): Approved in 2014.  It is a combination pill including an opioid receptor blocker and a long-standing antidepressant.  Most common side effects include nausea, constipation, headache, vomiting, dizziness, trouble sleeping, dry mouth, and diarrhea.  With all antidepressants watch for mood changes and suicide ideation.  Bupropion has been known to lower the seizure threshold in those prone to seizures.  It should not be used in a patient with a recent history of bulimia. It has been associated with liver damage from taking higher than recommended doses.  Do not use countrave if you have taken opioid medications or opioid street drugs in the past 7-10 days, if you are currently on opioids, methadone, or if you are pregnant.  Do not use contrave if you have recently stopped using alcohol or benzodiazepines.  Taper off contrave slowly.  Dosing: titrate up to 2 tablets twice daily of the Naltrexone 8 mg/ Bupropion 90 mg tablets.  $200 for 90 tablets    SAXENDA (Liraglutide): A daily injectable (3mg daily) medication used for type 2 diabetes (Victoza). Glucagon-like peptide-1 (GLP-1) agonist. Contraindications include personal or family history of medullary thyroid cancer or MEN type 2. Acute pancreatitis has been observed in patients taking liraglutide. Liraglutide causes C-cell tumors in rats and mice. It is unknown whether liraglutide causes tumors in humans. Start at 0.6mg, increasing the dose weekly up to 3mg.     TRULICITY (Dulaglutide): A weekly injectable (4.5mg weekly) medication used for type 2 diabetes. Glucagon-like peptide-1(GLP-1) agonist.  Contraindications include personal or family history of medullary thyroid cancer or MEN type 2. Acute pancreatitis has been observed in patients taking liraglutide. Dulaglutide causes C-cell tumors in rats and mice. It is unknown whether liraglutide causes tumors in humans. Start at 0.75 mg, 1.5 mg, 3.0 mg, to 4.5 mg increasing the dose monthly.      VYVANSE (Lisdexamfetamine dimesylate): a CNS stimulant used to treat ADHD. Indicated for the treatment of moderate to severe Binge Eating Disorder in Adults. Contraindicated in patients with known heart disease, structural abnormalities of the heart, serious heart arrhythmias or unexplained syncope. CNS stimulants such as vyvanse may cause manic or psychotic symptoms in patients with BPAD or pre-existing psychosis. Use with caution in patients with Raynaud's phenomenon. Most common side effects include dry mouth, insomnia, decreased appetite, increased heart rate, jittery feeling, constipation and anxiety.     WEGOVY (Semaglutide): A weekly injectable (2.4mg weekly) medication used for type 2 diabetes (Ozempic). Glucagon-like peptide-1 (GLP-1) agonist. Contraindications include personal or family history of medullary thyroid cancer or MEN type 2. Acute pancreatitis has been observed in patients taking Semaglutide. Semaglutide causes C-cell tumors in rats and mice. It is unknown whether Semaglutide causes tumors in humans. Start at 0.25mg, increasing to 0.5, 1.0, 1.7 then 2.4 monthly.     MOUNJARO (Tirzepatide): A weekly injectable medication indicated for type 2 diabetes. Glucagon-like-peptide-1 (GLP-1) agonist and Glucose-Dependent Insulinotropic Polypeptide (GIP) agonist. Contraindications include personal or family history of medullary thyroid cancer or MEN type 2. Acute pancreatitis has been observed in patients taking Tirzepatide. Tirzepatide causes C-cell tumors in rats and mice. It is unknown whether Tirzepatide causes tumors in humans. Watch for neck mass,  difficulty swallowing, persistent hoarseness, and epigastric abdominal pain. Most common side effects include nausea, diarrhea, vomiting, constipation, dyspepsia, and abdominal pain. Start at 2.5mg, increasing to 5.0, 7.5, 10, 12.5 then 15mg monthly.

## 2022-09-15 NOTE — PROGRESS NOTES
Bariatric Follow-up    31 year old  female BMI:Body mass index is 36.44 kg/m .    Weight:   Wt Readings from Last 1 Encounters:   09/15/22 84.6 kg (186 lb 9.6 oz)    pounds      Comorbidities:  Patient Active Problem List   Diagnosis     Migraine Headache     Midback Pain     Fainting (Syncope)     Mood Disorder Of Unknown (Axis III) Etiology     Low back pain     Fatty liver     Dyslipidemia     Elevated liver enzymes     Headache, migraine     Chronic constipation     Metabolic syndrome     Insulin resistance     Acanthosis nigricans     Family history of diabetes mellitus type II     Symptomatic cholelithiasis     Obesity (BMI 35.0-39.9) with comorbidity (H)         Interim: 11# weight loss. Met with Khushi and started walking. Drinking a lot of water. Smaller portions but not different foods. Eating up to 6 eggs a day (2 with rice and 3 with salt and lime)    Plan:  DIET  3 meals, protein first, more veggies, wheat not white   EXERCISE continue taking a walk a day-this is a known treatment for liver steatosis.    PHARMACOTHERAPY continue phentermine Consider GLP01 RA    Congratulated on decreasing portion sizes. Working with the dietitian will be helpful to introduce balance/fruits/veggies/lean proteins, variety in to her diet     -We reviewed her medications and whether associated with weight gain.  Current Outpatient Medications   Medication     aspirin-acetaminophen-caffeine (EXCEDRIN MIGRAINE) 250-250-65 mg per tablet     phentermine (ADIPEX-P) 37.5 MG tablet     No current facility-administered medications for this visit.        We discussed [facility] Bariatric Basics including:  -eating 3 meals daily  -eating protein first  -eating slowly, chewing food well  -avoiding/limiting calorie containing beverages  -choosing wheat, not white with breads, crackers, pastas, jhony, bagels, tortillas, rice  -limiting restaurant or cafeteria eating to twice a week or less  -We discussed the importance of  restorative sleep and stress management in maintaining a healthy weight.  -We discussed insulin resistance and glycemic index as it relates to appetite and weight control  -We discussed the National Weight Control Registry healthy weight maintenance strategies and ways to optimize metabolism.  -We discussed the importance of physical activity including cardiovascular and strength training in maintaining a healthier weight and explored viable options.    Most recent labs:    Lab Results   Component Value Date    CHOL 226 (H) 02/28/2014     Lab Results   Component Value Date    HDL 42 (L) 11/28/2017       Lab Results   Component Value Date    TRIG 129 02/28/2014       DIETARY HISTORY  Meals Per Day: 2-3  Eating Protein First?:   Food Diary: B:coffee and bagel L:whatever her mom makes, rice with eggs, chicken breasts breaded with rice, spaghetti D:chipoltle steak bowl  Snacks Per Day: no  Typical Snack: no  Fluid Intake: intentional  Portion Control: improved  Calorie Containing Beverages: no  Alcohol per week: no  Typical Protein Food Choices: variety  Choosing Whole Grains: no  Grocery Shopping is done by: shared.  Food Preparation is done by: her mom  Meals at Restaurant/Cafeteria/Take Out Per Week: 1/wk  Eating at the Table: occ  TV is Off During Meals: occ    Positive Changes Since Last Visit: more water, smaller portions  Struggling With: changing to healthy foods    Knowledgeable in Reading Food Labels: no  Getting Adequate Protein: yes  Sleeping 7-8 hours/day 7-8 hours  Stress management OK    PHYSICAL ACTIVITY PATTERNS:  Cardiovascular: walking now outside 30 min. With her dog-this month so 3-4 Xlwk  Strength Training: not yet    REVIEW OF SYSTEMS  GENERAL/CONSTITUTIONAL:  Fatigue: yes  CARDIOVASCULAR:  Chest Pain with Exertion: no  PULMONARY:  Dyspnea on exertion: yes  CPAP Use: no  Tobacco Use: no  Asthma Controlled: NA  GASTROINTESTINAL:  GERD/Heartburn: managed  Gallbladder:   UROLOGIC:  Urinary Symptoms:  no  NEUROLOGIC:  Headaches:no  Paresthesias: no  PSYCHIATRIC:  Moods: euthymic-better, more calm  MUSCULOSKELETAL/RHEUMATOLOGIC  Arthralgias: Ok  Myalgias: OK  ENDOCRINE:  Monitoring Blood Sugars: no  Sugars Well Controlled: yes  DERMATOLOGIC:  Rashes: no    PHYSICAL EXAM:  Vitals: /70   Ht 1.524 m (5')   Wt 84.6 kg (186 lb 9.6 oz)   BMI 36.44 kg/m    [unfilled]    GEN: Pleasant, well groomed, in no acute distress  EYES: EOMI,  ENT: airway patent  NECK: no carotid bruits, no anterior/supraclavicular lymphadenopathy, thyroid normal   HEART: Rhythm regular, rate regular, no murmur   LUNGS: Clear  ABDOMEN: soft, non-tender, obese, no rashes   VASCULAR: no  lower extremity edema  MUSCULOSKELETAL:  muscle mass OK  SKIN:  no color changes of venous stasis, no ulcerations    Total time spent on the date of this encounter doing: chart review, review of test results, patient visit, physical exam, education, counseling, developing plan of care, and documenting = 30 minutes.

## 2022-09-24 ENCOUNTER — HEALTH MAINTENANCE LETTER (OUTPATIENT)
Age: 32
End: 2022-09-24

## 2022-12-19 ENCOUNTER — VIRTUAL VISIT (OUTPATIENT)
Dept: SURGERY | Facility: CLINIC | Age: 32
End: 2022-12-19
Payer: COMMERCIAL

## 2022-12-19 DIAGNOSIS — E66.9 OBESITY (BMI 30-39.9): Primary | ICD-10-CM

## 2022-12-19 PROCEDURE — 97803 MED NUTRITION INDIV SUBSEQ: CPT | Mod: GT | Performed by: DIETITIAN, REGISTERED

## 2022-12-19 NOTE — LETTER
"    12/19/2022         RE: Viviana Dumont  [address] [place] [facility] [address]        Dear Colleague,    Thank you for referring your patient, Viviana Dumont, to the [facility] [place]. Please see a copy of my visit note below.    Viviana Dumont is a 32 year old who is being evaluated via a billable video visit.      How would you like to obtain your AVS? MyChart  If the video visit is dropped, the invitation should be resent by: Send to e-mail at: [email or web address]  Will anyone else be joining your video visit? No      Medical Weight Loss Initial Diet Evaluation  Assessment:  Viviana is presenting today for a new weight management nutrition consultation. Pt has had an initial appointment with Dr. Childers.  Weight loss medication: Phentermine.     Personal Goals:      Anthropometrics:    Pt's weight is 210 lb (2/5/21)  Initial weight: 175 lb  Weight change: down 35 lbs from initial  BMI: There is no height or weight on file to calculate BMI.   Patient weight not recorded  Estimated RMR (Dixie-St Jeor equation):  1417 kcals x 1.2 (sedentary) = 1700 kcals (for weight maintenance)    Recommended Protein Intake: 70-90 grams of protein/day    Medical History:  Patient Active Problem List   Diagnosis     Migraine Headache     Midback Pain     Fainting (Syncope)     Mood Disorder Of Unknown (Axis III) Etiology     Low back pain     Fatty liver     Dyslipidemia     Elevated liver enzymes     Headache, migraine     Chronic constipation     Metabolic syndrome     Insulin resistance     Acanthosis nigricans     Family history of diabetes mellitus type II     Symptomatic cholelithiasis     Obesity (BMI 35.0-39.9) with comorbidity (H)      Diabetes: No  HbA1c:  No results found for: HGBA1C    Nutrition History:   Weight loss history: When she first started, she lost 50 lbs initially, but it wasn't a sustainable way  Trying not to \"hyper " "focus\" on her nutrition and obsess over it    Dietary Recall:  \"Eating is all over the place\" - she notices she needs structure to her eating routine  Breakfast: coffee with regular half and half, brown sugar - sometimes will have bagel with eggs, and lim OR waffle with eggs and lim  Lunch: 3-4 pm Chicken or other protein, rice  Dinner: snack size meal - not healthy    Beverages:   Water  Soda: cut back    Exercise:   Cardiovascular: walking, Youtube videos for at home workouts  Strength Training: videos    Nutrition Diagnosis (PES statement):   Overweight/Obesity (NC 3.3) related to overeating and poor lifestyle habits as evidenced by patient report of no eating routine, lack of vegetable and proteinintake and BMI 36.44    Nutrition Intervention  1. Food and/or Nutrient Delivery   a. Placed emphasis on importance of developing a healthy meal routine, aiming for 3 meals a day and no snacks.  2. Nutrition Education   a. Discussed with patient how to build a meal: the importance of including a lean/low fat protein at each meal, include a source of vegetables at least once each day to start  b. Educated on sources of lean protein, portion sizes, the amount of grams found in each source. Recommend patient to aim for 20-30g protein at each meal.  c. Discussed the importance of adequate hydration, with emphasis on drinking 64oz of water or zero calorie beverages per day.  3. Nutrition Counseling   a. Encouraged importance of developing routine exercise for health benefits and weight loss.    Goals established by patient:   1. Start meal planning and prepping for the week  2. Aim for at least 2 meals per day, based in protein and adding in veggies  3. Have veggies at at least one meal daily    Assessment/Plan:    Pt will follow up in 3 month(s) with bariatrician and 4 month(s) with dietitian.     Video-Visit Details    Type of service:  Video Visit    Video Start Time (time video started): 1:37 pm    Video End Time " (time video stopped): 1:50 pm    Originating Location (pt. Location): Home        Distant Location (provider location):  Off-site    Mode of Communication:  Video Conference via [facility]    Physician has received verbal consent for a Video Visit from the patient? Yes      Zhane Wilson        Again, thank you for allowing me to participate in the care of your patient.        Sincerely,        Zhane Wilson

## 2022-12-19 NOTE — PROGRESS NOTES
"Viviana Dumont is a 32 year old who is being evaluated via a billable video visit.      How would you like to obtain your AVS? MyChart  If the video visit is dropped, the invitation should be resent by: Send to e-mail at: [email or web address]  Will anyone else be joining your video visit? No      Medical Weight Loss Initial Diet Evaluation  Assessment:  Viviana is presenting today for a new weight management nutrition consultation. Pt has had an initial appointment with Dr. Childers.  Weight loss medication: Phentermine.     Personal Goals:      Anthropometrics:    Pt's weight is 210 lb (2/5/21)  Initial weight: 175 lb  Weight change: down 35 lbs from initial  BMI: There is no height or weight on file to calculate BMI.   Patient weight not recorded  Estimated RMR ([place]-St Jeor equation):  1417 kcals x 1.2 (sedentary) = 1700 kcals (for weight maintenance)    Recommended Protein Intake: 70-90 grams of protein/day    Medical History:  Patient Active Problem List   Diagnosis     Migraine Headache     Midback Pain     Fainting (Syncope)     Mood Disorder Of Unknown (Axis III) Etiology     Low back pain     Fatty liver     Dyslipidemia     Elevated liver enzymes     Headache, migraine     Chronic constipation     Metabolic syndrome     Insulin resistance     Acanthosis nigricans     Family history of diabetes mellitus type II     Symptomatic cholelithiasis     Obesity (BMI 35.0-39.9) with comorbidity (H)      Diabetes: No  HbA1c:  No results found for: HGBA1C    Nutrition History:   Weight loss history: When she first started, she lost 50 lbs initially, but it wasn't a sustainable way  Trying not to \"hyper focus\" on her nutrition and obsess over it    Dietary Recall:  \"Eating is all over the place\" - she notices she needs structure to her eating routine  Breakfast: coffee with regular half and half, brown sugar - sometimes will have bagel with eggs, and lim OR waffle with eggs and lim  Lunch: 3-4 pm " Chicken or other protein, rice  Dinner: snack size meal - not healthy    Beverages:   Water  Soda: cut back    Exercise:   Cardiovascular: walking, Youtube videos for at home workouts  Strength Training: videos    Nutrition Diagnosis (PES statement):   Overweight/Obesity (NC 3.3) related to overeating and poor lifestyle habits as evidenced by patient report of no eating routine, lack of vegetable and proteinintake and BMI 36.44    Nutrition Intervention  1. Food and/or Nutrient Delivery   a. Placed emphasis on importance of developing a healthy meal routine, aiming for 3 meals a day and no snacks.  2. Nutrition Education   a. Discussed with patient how to build a meal: the importance of including a lean/low fat protein at each meal, include a source of vegetables at least once each day to start  b. Educated on sources of lean protein, portion sizes, the amount of grams found in each source. Recommend patient to aim for 20-30g protein at each meal.  c. Discussed the importance of adequate hydration, with emphasis on drinking 64oz of water or zero calorie beverages per day.  3. Nutrition Counseling   a. Encouraged importance of developing routine exercise for health benefits and weight loss.    Goals established by patient:   1. Start meal planning and prepping for the week  2. Aim for at least 2 meals per day, based in protein and adding in veggies  3. Have veggies at at least one meal daily    Assessment/Plan:    Pt will follow up in 3 month(s) with bariatrician and 4 month(s) with dietitian.     Video-Visit Details    Type of service:  Video Visit    Video Start Time (time video started): 1:37 pm    Video End Time (time video stopped): 1:50 pm    Originating Location (pt. Location): Home        Distant Location (provider location):  Off-site    Mode of Communication:  Video Conference via [employer]    Physician has received verbal consent for a Video Visit from the patient? Yes      Zhane Wilson

## 2022-12-27 ENCOUNTER — NURSE TRIAGE (OUTPATIENT)
Dept: FAMILY MEDICINE | Facility: CLINIC | Age: 32
End: 2022-12-27

## 2022-12-27 ENCOUNTER — NURSE TRIAGE (OUTPATIENT)
Dept: NURSING | Facility: CLINIC | Age: 32
End: 2022-12-27

## 2022-12-27 DIAGNOSIS — E88.819 INSULIN RESISTANCE: ICD-10-CM

## 2022-12-27 DIAGNOSIS — E66.3 OVERWEIGHT: ICD-10-CM

## 2022-12-27 DIAGNOSIS — E88.810 METABOLIC SYNDROME: ICD-10-CM

## 2022-12-27 RX ORDER — PHENTERMINE HYDROCHLORIDE 37.5 MG/1
TABLET ORAL
Qty: 90 TABLET | Refills: 1 | Status: SHIPPED | OUTPATIENT
Start: 2022-12-27 | End: 2023-06-16

## 2022-12-27 NOTE — TELEPHONE ENCOUNTER
Clinic Action Needed:Yes    Reason for Call:    Refill request phentermine 37.5 mg tab.    Last filled 6/22/22 90 tablets, 1 refill.    Mirna Logan RN  [facility]            Reason for Disposition    Prescription refill request for NON-ESSENTIAL medicine (i.e., no harm to patient if med not taken) and triager unable to refill per department policy    Additional Information    Negative: New-onset or worsening symptoms, see that protocol (e.g., diarrhea, runny nose, sore throat)    Negative: Medicine question not related to refill or renewal    Negative: Caller (e.g., patient or pharmacist) requesting information about a new medicine    Negative: Caller requesting information unrelated to medicine    Negative: Prescription refill request for ESSENTIAL medicine (i.e., likelihood of harm to patient if not taken) and triager unable to refill per department policy    Negative: Prescription not at pharmacy and was prescribed by PCP recently  (Exception: triager has access to EMR and prescription is recorded there. Go to Home Care and confirm for pharmacy.)    Negative: Pharmacy calling with prescription questions and triager unable to answer question    Negative: Caller requesting a CONTROLLED substance prescription refill (e.g., narcotics, ADHD medicines)    Protocols used: MEDICATION REFILL AND RENEWAL CALL-A-[place]

## 2023-03-14 ENCOUNTER — OFFICE VISIT (OUTPATIENT)
Dept: SURGERY | Facility: CLINIC | Age: 33
End: 2023-03-14
Payer: COMMERCIAL

## 2023-03-14 VITALS
DIASTOLIC BLOOD PRESSURE: 72 MMHG | WEIGHT: 171 LBS | SYSTOLIC BLOOD PRESSURE: 126 MMHG | HEIGHT: 60 IN | BODY MASS INDEX: 33.57 KG/M2

## 2023-03-14 DIAGNOSIS — E88.810 METABOLIC SYNDROME: ICD-10-CM

## 2023-03-14 PROCEDURE — 99213 OFFICE O/P EST LOW 20 MIN: CPT | Performed by: FAMILY MEDICINE

## 2023-03-14 NOTE — PROGRESS NOTES
"Bariatric Follow-up    32 year old  female BMI:Body mass index is 33.4 kg/m .    Weight:   Wt Readings from Last 1 Encounters:   03/14/23 77.6 kg (171 lb)    pounds    Comorbidities:  Patient Active Problem List   Diagnosis     Migraine Headache     Midback Pain     Fainting (Syncope)     Mood Disorder Of Unknown (Axis III) Etiology     Low back pain     Fatty liver     Dyslipidemia     Elevated liver enzymes     Headache, migraine     Chronic constipation     Metabolic syndrome     Insulin resistance     Acanthosis nigricans     Family history of diabetes mellitus type II     Symptomatic cholelithiasis     Obesity (BMI 35.0-39.9) with comorbidity (H)       Interim: Maintaining a 49# weight loss with phentermine. She was 220# prior to coming here and lost to 197# and now down another 26#.  Feeling much more energy. Still not exercising. Does not like metformin.    Plan:  DIET  3 meals, protein first. Continuing to work toward more whole foods, nutrient rich and lean proteins will be helpful   EXERCISE start those on demand workouts and strength training   PHARMACOTHERAPY continue phentermine    Congratulated on excellent lifestyle and dietary changes. Encouraged to optimize metabolism with \"move\" and \"muscle\" which she has been contemplating. Rechecck cholesterol at some point. Suspect LDL will have normalized.     -We reviewed her medications and whether associated with weight gain.  Current Outpatient Medications   Medication     aspirin-acetaminophen-caffeine (EXCEDRIN MIGRAINE) 250-250-65 mg per tablet     phentermine (ADIPEX-P) 37.5 MG tablet     No current facility-administered medications for this visit.        We discussed [facility] Bariatric Basics including:  -eating 3 meals daily  -eating protein first  -eating slowly, chewing food well  -avoiding/limiting calorie containing beverages  -choosing wheat, not white with breads, crackers, pastas, jhony, bagels, tortillas, rice  -limiting restaurant or " cafeteria eating to twice a week or less  -We discussed the importance of restorative sleep and stress management in maintaining a healthy weight.  -We discussed insulin resistance and glycemic index as it relates to appetite and weight control  -We discussed the National Weight Control Registry healthy weight maintenance strategies and ways to optimize metabolism.  -We discussed the importance of physical activity including cardiovascular and strength training in maintaining a healthier weight and explored viable options.    Most recent labs:    Lab Results   Component Value Date    CHOL 226 (H) 02/28/2014     Lab Results   Component Value Date    HDL 42 (L) 11/28/2017       Lab Results   Component Value Date    TRIG 129 02/28/2014     DIETARY HISTORY  Meals Per Day: 3  Eating Protein First?: yes  Food Diary: B:egg, lim cheese sandwich with coffee at home,  L:leftovers or chipoltle D:chicken grilled with salad and potatoes  Snacks Per Day: not a lot  Typical Snack: around her period, edgar or chips  Fluid Intake: trying  Portion Control: improved  Calorie Containing Beverages: occ Coke or Dr. Pepper  Alcohol per week: rare  Typical Protein Food Choices: variety  Choosing Whole Grains: mix,   Grocery Shopping is done by: herself  Food Preparation is done by: herself  Meals at Restaurant/Cafeteria/Take Out Per Week: 2-3  Eating at the Table:   TV is Off During Meals:     Positive Changes Since Last Visit:   Struggling With:     Knowledgeable in Reading Food Labels:   Getting Adequate Protein: yes  Sleeping 7-8 hours/day close  Stress management OK    PHYSICAL ACTIVITY PATTERNS:  Cardiovascular: walking as she can. Has a subscription to workouts on demand.  Strength Training: starting soon    REVIEW OF SYSTEMS  GENERAL/CONSTITUTIONAL:  Fatigue: improved  CARDIOVASCULAR:  Chest Pain with Exertion: no  PULMONARY:  Dyspnea on exertion: improved  CPAP Use: no  Tobacco Use: no  Asthma Controlled:    GASTROINTESTINAL:  GERD/Heartburn: no  Gallbladder:   UROLOGIC:  Urinary Symptoms: no  NEUROLOGIC:  Headaches: occ/normal  Paresthesias: no  PSYCHIATRIC:  Moods: stable  MUSCULOSKELETAL/RHEUMATOLOGIC  Arthralgias: no  Myalgias: no  ENDOCRINE:  Monitoring Blood Sugars: no  Sugars Well Controlled: yes  DERMATOLOGIC:  Rashes: no    PHYSICAL EXAM:  Vitals: /72   Ht 1.524 m (5')   Wt 77.6 kg (171 lb)   Breastfeeding No   BMI 33.40 kg/m      GEN: Pleasant, well groomed, in no acute distress  EYES: EOMI,  ENT: airway patent  NECK: no carotid bruits, no anterior/supraclavicular lymphadenopathy, thyroid normal   HEART: Rhythm regular, rate regular, no murmur   LUNGS: Clear  ABDOMEN: soft, non-tender, obese, no rashes   VASCULAR: no  lower extremity edema  MUSCULOSKELETAL:  muscle mass OK  SKIN:  no color changes of venous stasis, no ulcerations    Total time spent on the date of this encounter doing: chart review, review of test results, patient visit, physical exam, education, counseling, developing plan of care, and documenting = 20minutes.

## 2023-03-14 NOTE — PATIENT INSTRUCTIONS
"WEIGHT MAINTENANCE STRATEGIES    According to the National Weight Control Registry there are several things that people who have lost weight and kept it off have in common. Some of them are...    1. 3 MEALS A DAY:  Make sure you are eating 3 meals each day.  No skipping meals.  80% of people who skip meals are overweight or obese.  Missing meals slows the metabolism, making it harder to maintain a healthy weight.    2. FOOD DIARY:  Much like keeping a ledger for your checkbook, keeping a food and exercise diary helps you \"keep track\" of the balance of energy (calories) in and energy out. It also helps you recognize potential unhealthy deviations from healthy patterns before they become habit. It's a nice way to monitor whether you are getting the protein, fiber, and other nutrients that your body needs.    3. FOLLOW-UP:  Studies show that those who follow up with their health professional regularly maintained their weight loss and those who are \"lost to follow-up \"are at risk for regain.  Moreover, it is essential to monitor vitamin levels with laboratory studies for life following bariatric surgery.     4.  HIGH FIBER/LOW FAT:  Lean sources of protein (skim milk, skinless, baked or broiled chicken breast, fish, etc.)  will help you meet your protein needs while fruits, vegetables, and whole grains will help you get the fiber that your body needs.  This is heart healthy eating and helps to keep calorie levels in balance.    5.  8,000 STEPS PER DAY:  This is a \"weight maintenance dose. \"  It is essential to get your steps in every day, 7 days a week.  You don't have to \"work out \" 7 days a week, but throughout the day, getting 8000 steps will help you maintain the weight you have lost.  Parking far away, taking the stairs instead of the elevator, and pacing while on the phone are some ways to help achieve this goal.    6.  Eat at home 90% OF THE TIME:  People who maintain a healthy weight eat at home, or meal " "prepared at home, 90% of the time.  Studies show that people consume an average of 770 dakotah when eating out at a restaurant and 440 dakotah when eating a meal prepared at home.  This equates to almost 35 pounds of excess weight for a person who eats out once a day for 1 year.      OTHER HELPFUL HABITS    -Minimize liquid calories.  Skim milk is okay.  -Avoiding \"mindless \"eating, i.e., eating at the TV, and the car, in front of the computer.  -Protect your sleep and Manage your stress        OPTIMIZING YOUR METABOLISM FOR LIFE    1.  MUSCLE MAINTENANCE: Muscle burns calories up to 70% better than fat test.  As we age her body composition changes. We lose muscle mass.  Weight training can help us keep and even build muscle mass.  Dumbbells, pushups, rubber band training, weight machines are all examples of ways to keep and/or build muscle mass.    2.  MOVE: 8000 steps daily has been shown to be a weight maintenance dose.  Aim for 10,000 steps each day. Helpfull habits include taking the stairs instead of the elevator when possible, parking at the far end of the parking lot, pacing while on the phone, and taking the dog for a walk.  Of course using a treadmill, stair climber, elliptical , and bicycle are all ways of getting 10,000 steps.    3.  3 MEALS EACH DAY: Make sure to get your 3 meals each day.  Skipping breakfast, working through your lunch, or not eating dinner will lead to a slowing of your metabolism.  Studies show that 80% of people who skip meals are overweight or obese.    4.  ADEQUATE PROTEIN INTAKE: Getting adequate protein is beneficial for a number of reasons: to aid the healing process, to blunt cravings immediately after eating and for a period of time after eating, to help keep blood sugars level, and to help you maintain your muscle mass.  See #1 above.   "

## 2023-03-14 NOTE — LETTER
"    3/14/2023         RE: Viviana Dumont  [address] [facility] [address]        Dear Colleague,    Thank you for referring your patient, Viviana Dumont, to the [facility] [place]. Please see a copy of my visit note below.    Bariatric Follow-up    32 year old  female BMI:Body mass index is 33.4 kg/m .    Weight:   Wt Readings from Last 1 Encounters:   03/14/23 77.6 kg (171 lb)    pounds    Comorbidities:  Patient Active Problem List   Diagnosis     Migraine Headache     Midback Pain     Fainting (Syncope)     Mood Disorder Of Unknown (Axis III) Etiology     Low back pain     Fatty liver     Dyslipidemia     Elevated liver enzymes     Headache, migraine     Chronic constipation     Metabolic syndrome     Insulin resistance     Acanthosis nigricans     Family history of diabetes mellitus type II     Symptomatic cholelithiasis     Obesity (BMI 35.0-39.9) with comorbidity (H)       Interim: Maintaining a 49# weight loss with phentermine. She was 220# prior to coming here and lost to 197# and now down another 26#.  Feeling much more energy. Still not exercising. Does not like metformin.    Plan:  DIET  3 meals, protein first. Continuing to work toward more whole foods, nutrient rich and lean proteins will be helpful   EXERCISE start those on demand workouts and strength training   PHARMACOTHERAPY continue phentermine    Congratulated on excellent lifestyle and dietary changes. Encouraged to optimize metabolism with \"move\" and \"muscle\" which she has been contemplating. Rechecck cholesterol at some point. Suspect LDL will have normalized.     -We reviewed her medications and whether associated with weight gain.  Current Outpatient Medications   Medication     aspirin-acetaminophen-caffeine (EXCEDRIN MIGRAINE) 250-250-65 mg per tablet     phentermine (ADIPEX-P) 37.5 MG tablet     No current facility-administered medications for this visit.    "     We discussed [facility] Bariatric Basics including:  -eating 3 meals daily  -eating protein first  -eating slowly, chewing food well  -avoiding/limiting calorie containing beverages  -choosing wheat, not white with breads, crackers, pastas, jhony, bagels, tortillas, rice  -limiting restaurant or cafeteria eating to twice a week or less  -We discussed the importance of restorative sleep and stress management in maintaining a healthy weight.  -We discussed insulin resistance and glycemic index as it relates to appetite and weight control  -We discussed the National Weight Control Registry healthy weight maintenance strategies and ways to optimize metabolism.  -We discussed the importance of physical activity including cardiovascular and strength training in maintaining a healthier weight and explored viable options.    Most recent labs:    Lab Results   Component Value Date    CHOL 226 (H) 02/28/2014     Lab Results   Component Value Date    HDL 42 (L) 11/28/2017       Lab Results   Component Value Date    TRIG 129 02/28/2014     DIETARY HISTORY  Meals Per Day: 3  Eating Protein First?: yes  Food Diary: B:egg, lim cheese sandwich with coffee at home,  L:leftovers or chipoltle D:chicken grilled with salad and potatoes  Snacks Per Day: not a lot  Typical Snack: around her period, edgar or chips  Fluid Intake: trying  Portion Control: improved  Calorie Containing Beverages: occ Brennen or Dr. Pepper  Alcohol per week: rare  Typical Protein Food Choices: variety  Choosing Whole Grains: mix,   Grocery Shopping is done by: herself  Food Preparation is done by: herself  Meals at Restaurant/Cafeteria/Take Out Per Week: 2-3  Eating at the Table:   TV is Off During Meals:     Positive Changes Since Last Visit:   Struggling With:     Knowledgeable in Reading Food Labels:   Getting Adequate Protein: yes  Sleeping 7-8 hours/day close  Stress management OK    PHYSICAL ACTIVITY PATTERNS:  Cardiovascular: walking as she can. Has a  subscription to workouts on demand.  Strength Training: starting soon    REVIEW OF SYSTEMS  GENERAL/CONSTITUTIONAL:  Fatigue: improved  CARDIOVASCULAR:  Chest Pain with Exertion: no  PULMONARY:  Dyspnea on exertion: improved  CPAP Use: no  Tobacco Use: no  Asthma Controlled:   GASTROINTESTINAL:  GERD/Heartburn: no  Gallbladder:   UROLOGIC:  Urinary Symptoms: no  NEUROLOGIC:  Headaches: occ/normal  Paresthesias: no  PSYCHIATRIC:  Moods: stable  MUSCULOSKELETAL/RHEUMATOLOGIC  Arthralgias: no  Myalgias: no  ENDOCRINE:  Monitoring Blood Sugars: no  Sugars Well Controlled: yes  DERMATOLOGIC:  Rashes: no    PHYSICAL EXAM:  Vitals: /72   Ht 1.524 m (5')   Wt 77.6 kg (171 lb)   Breastfeeding No   BMI 33.40 kg/m      GEN: Pleasant, well groomed, in no acute distress  EYES: EOMI,  ENT: airway patent  NECK: no carotid bruits, no anterior/supraclavicular lymphadenopathy, thyroid normal   HEART: Rhythm regular, rate regular, no murmur   LUNGS: Clear  ABDOMEN: soft, non-tender, obese, no rashes   VASCULAR: no  lower extremity edema  MUSCULOSKELETAL:  muscle mass OK  SKIN:  no color changes of venous stasis, no ulcerations    Total time spent on the date of this encounter doing: chart review, review of test results, patient visit, physical exam, education, counseling, developing plan of care, and documenting = 20minutes.              Again, thank you for allowing me to participate in the care of your patient.        Sincerely,        Martine Childers MD

## 2023-04-12 ENCOUNTER — VIRTUAL VISIT (OUTPATIENT)
Dept: SURGERY | Facility: CLINIC | Age: 33
End: 2023-04-12
Payer: COMMERCIAL

## 2023-04-12 DIAGNOSIS — E66.9 OBESITY (BMI 30-39.9): Primary | ICD-10-CM

## 2023-04-12 PROCEDURE — 97803 MED NUTRITION INDIV SUBSEQ: CPT | Mod: VID | Performed by: DIETITIAN, REGISTERED

## 2023-04-12 NOTE — PROGRESS NOTES
Viviana Dumont is a 32 year old who is being evaluated via a billable telephone visit.        Medical  Weight Loss Follow-Up Diet Evaluation  Assessment:  Viviana is presenting today for a follow up weight management nutrition consultation.  This patient has had an initial appointment and was referred by Dr. Childers for MNT as treatment for Obesity which is impacting her Fatty liver; GERD (gastroesophageal reflux disease); Headache, migraine; Insulin resistance; Low back pain; Metabolic syndrome; Migraines; and Morbid obesity.  Weight loss medication: Phentermine.  Pt's weight is 168 lb  Initial weight: 210 lb (2/5/21)  220 lb (4/22 - her highest weight)  Weight change: -52 lbs from 220 lbs          View : No data to display.              BMI: There is no height or weight on file to calculate BMI.  Ideal body weight: 45.5 kg (100 lb 4.9 oz)  Adjusted ideal body weight: 58.3 kg (128 lb 9.4 oz)    Estimated RMR ([place]-St Jeor equation):  1417 kcals x 1.2 (sedentary) = 1700 kcals (for weight maintenance)  Recommended Protein Intake: 70-90 grams of protein/day  Patient Active Problem List:  Patient Active Problem List   Diagnosis     Migraine Headache     Midback Pain     Fainting (Syncope)     Mood Disorder Of Unknown (Axis III) Etiology     Low back pain     Fatty liver     Dyslipidemia     Elevated liver enzymes     Headache, migraine     Chronic constipation     Metabolic syndrome     Insulin resistance     Acanthosis nigricans     Family history of diabetes mellitus type II     Symptomatic cholelithiasis     Obesity (BMI 35.0-39.9) with comorbidity (H)     Diabetes: no, but insulin resistance and family hx of type 2 diabetes    Progress on goals from last visit: Still eating only one time per day because she is not hungry during the day. She is planning to discuss challenges with doing daily tasks, including eating regular meals and drinking water, related to her ADHD with her therapist. She is having  difficulty maintaining a routine with her nutrition intake and reaching her nutrition goals because of her struggles with ADHD.   1. Start meal planning and prepping for the week  2. Aim for at least 2 meals per day, based in protein and adding in veggies  3. Have veggies at at least one meal daily - not met, but working on this    Dietary Recall:  Eating one meal per day usually. She is working on focusing on good food choices, such as lean protein at meals and including vegetables.   Beverages:   Water: has some weeks where she does well with drinking water and weeks she does not drink enough. She is aiming for 3 bottles daily as an initial goal.   Coffee  Soda: infrequent  Exercise:   Going to start walking more, trying to walk 1x per day  She has a workout program that she purchased a year ago and would like to start doing, but is struggling getting started with it  Nutrition Diagnosis:    Overweight/Obesity (NC 3.3) related to overeating and poor lifestyle habits as evidenced by patient report of no eating routine, lack of vegetable and proteinintake and BMI 36.44    Intervention:  1. Food and/or nutrient delivery: Aim to eat regular meals at least 2, aiming for 3 per day, prioritizing lean protein and including vegetables  2. Nutrition counseling: goal setting, support for continued success    Monitoring/Evaluation:    Goals:  1. Drink at least 3 water bottles  2. Walking on a daily basis, and do some homeworkouts  3. Aim for at least 2 meals per day, working toward 3, based in lean protein and adding in vegetables    Patient to follow up in 2 month(s) with bariatrician and with RD - patient will call or message to set up as she did not have her calendar in front of her.    Phone call duration: 14 minutes        Distant Location (provider location):  Off-site    Zhane Wilson RD

## 2023-04-12 NOTE — LETTER
4/12/2023         RE: Viviana Dumont  [address] [facility] [address]        Dear Colleague,    Thank you for referring your patient, Viviana Dumont, to the [facility] SURGERY CLINIC AND BARIATRICS CARE [place]. Please see a copy of my visit note below.    Viviana Dumont is a 32 year old who is being evaluated via a billable telephone visit.        Medical  Weight Loss Follow-Up Diet Evaluation  Assessment:  Viviana is presenting today for a follow up weight management nutrition consultation.  This patient has had an initial appointment and was referred by Dr. Childers for MNT as treatment for Obesity which is impacting her Fatty liver; GERD (gastroesophageal reflux disease); Headache, migraine; Insulin resistance; Low back pain; Metabolic syndrome; Migraines; and Morbid obesity.  Weight loss medication: Phentermine.  Pt's weight is 168 lb  Initial weight: 210 lb (2/5/21)  220 lb (4/22 - her highest weight)  Weight change: -52 lbs from 220 lbs          View : No data to display.              BMI: There is no height or weight on file to calculate BMI.  Ideal body weight: 45.5 kg (100 lb 4.9 oz)  Adjusted ideal body weight: 58.3 kg (128 lb 9.4 oz)    Estimated RMR (Isabella-St Jeor equation):  1417 kcals x 1.2 (sedentary) = 1700 kcals (for weight maintenance)  Recommended Protein Intake: 70-90 grams of protein/day  Patient Active Problem List:  Patient Active Problem List   Diagnosis     Migraine Headache     Midback Pain     Fainting (Syncope)     Mood Disorder Of Unknown (Axis III) Etiology     Low back pain     Fatty liver     Dyslipidemia     Elevated liver enzymes     Headache, migraine     Chronic constipation     Metabolic syndrome     Insulin resistance     Acanthosis nigricans     Family history of diabetes mellitus type II     Symptomatic cholelithiasis     Obesity (BMI 35.0-39.9) with comorbidity (H)     Diabetes: no, but insulin resistance and family hx of  type 2 diabetes    Progress on goals from last visit: Still eating only one time per day because she is not hungry during the day. She is planning to discuss challenges with doing daily tasks, including eating regular meals and drinking water, related to her ADHD with her therapist. She is having difficulty maintaining a routine with her nutrition intake and reaching her nutrition goals because of her struggles with ADHD.   1. Start meal planning and prepping for the week  2. Aim for at least 2 meals per day, based in protein and adding in veggies  3. Have veggies at at least one meal daily - not met, but working on this    Dietary Recall:  Eating one meal per day usually. She is working on focusing on good food choices, such as lean protein at meals and including vegetables.   Beverages:   Water: has some weeks where she does well with drinking water and weeks she does not drink enough. She is aiming for 3 bottles daily as an initial goal.   Coffee  Soda: infrequent  Exercise:   Going to start walking more, trying to walk 1x per day  She has a workout program that she purchased a year ago and would like to start doing, but is struggling getting started with it  Nutrition Diagnosis:    Overweight/Obesity (NC 3.3) related to overeating and poor lifestyle habits as evidenced by patient report of no eating routine, lack of vegetable and proteinintake and BMI 36.44    Intervention:  1. Food and/or nutrient delivery: Aim to eat regular meals at least 2, aiming for 3 per day, prioritizing lean protein and including vegetables  2. Nutrition counseling: goal setting, support for continued success    Monitoring/Evaluation:    Goals:  1. Drink at least 3 water bottles  2. Walking on a daily basis, and do some homeworkouts  3. Aim for at least 2 meals per day, working toward 3, based in lean protein and adding in vegetables    Patient to follow up in 2 month(s) with bariatrician and with RD - patient will call or message to  set up as she did not have her calendar in front of her.    Phone call duration: 14 minutes        Distant Location (provider location):  Off-site    Zhane Wilson RD          Again, thank you for allowing me to participate in the care of your patient.        Sincerely,        Zhane Wilson RD

## 2023-05-08 ENCOUNTER — HEALTH MAINTENANCE LETTER (OUTPATIENT)
Age: 33
End: 2023-05-08

## 2023-06-16 ENCOUNTER — VIRTUAL VISIT (OUTPATIENT)
Dept: SURGERY | Facility: CLINIC | Age: 33
End: 2023-06-16
Payer: COMMERCIAL

## 2023-06-16 VITALS — HEIGHT: 60 IN | BODY MASS INDEX: 33.38 KG/M2 | WEIGHT: 170 LBS

## 2023-06-16 DIAGNOSIS — E66.3 OVERWEIGHT: ICD-10-CM

## 2023-06-16 DIAGNOSIS — E88.810 METABOLIC SYNDROME: ICD-10-CM

## 2023-06-16 DIAGNOSIS — E88.819 INSULIN RESISTANCE: ICD-10-CM

## 2023-06-16 PROCEDURE — 99213 OFFICE O/P EST LOW 20 MIN: CPT | Mod: VID | Performed by: FAMILY MEDICINE

## 2023-06-16 RX ORDER — BUPROPION HYDROCHLORIDE 150 MG/1
150 TABLET ORAL EVERY MORNING
COMMUNITY
Start: 2023-05-19

## 2023-06-16 RX ORDER — PHENTERMINE HYDROCHLORIDE 37.5 MG/1
TABLET ORAL
Qty: 90 TABLET | Refills: 1 | Status: SHIPPED | OUTPATIENT
Start: 2023-06-16 | End: 2024-01-30

## 2023-06-16 NOTE — LETTER
"    6/16/2023         RE: Viviana Dumont  [address] [facility] [address]        Dear Colleague,    Thank you for referring your patient, Viviana Dumont, to the [facility] SURGERY CLINIC AND BARIATRICS CARE [place]. Please see a copy of my visit note below.    Viviana Dumont is 32 year old  female who presents for a billable video visit today.    How would you like to obtain your AVS? MyChart  If dropped from the video visit, the video invitation should be resent by: Text to cell phone: [phone]  Will anyone else be joining your video visit? No      Video Start Time: 1:00    Are there any specific questions or needs that you would like addressed at your visit today? Phentermine refill   Bariatric Follow-up    32 year old  female BMI:Body mass index is 33.2 kg/m .    Weight:   Wt Readings from Last 1 Encounters:   06/16/23 77.1 kg (170 lb)    pounds    Comorbidities:  Patient Active Problem List   Diagnosis     Migraine Headache     Midback Pain     Fainting (Syncope)     Mood Disorder Of Unknown (Axis III) Etiology     Low back pain     Fatty liver     Dyslipidemia     Elevated liver enzymes     Headache, migraine     Chronic constipation     Metabolic syndrome     Insulin resistance     Acanthosis nigricans     Family history of diabetes mellitus type II     Symptomatic cholelithiasis     Obesity (BMI 35.0-39.9) with comorbidity (H)         Interim: Doing well. New boyfriend and eating out more so put some weight on. Maintaining a 27# weight loss. Takine 1 tab in the am.Sleeping OK. Bowels moving OK. LDL has normalized with her weight loss and BP and A1c normal. Walking more. Never smoker.    Plan:  DIET  Meal planning and bringing lunches with fruit, veggies, lean protein will help keep LDL down   EXERCISE think of active dates: hiking [place], walking around a lake or the block, biking, and other fun \"dates\". Can do youtube videos or a quick 7 minute workout " until able to get back to the gym for strength training   PHARMACOTHERAPY refill phenetrmine    Encouraged RD visit to address meal planning and bringing lunches to reduce frequency of eating out-save eating out for the evening dates and to save money as well.     -We reviewed her medications and whether associated with weight gain.    Current Outpatient Medications:      aspirin-acetaminophen-caffeine (EXCEDRIN MIGRAINE) 250-250-65 mg per tablet, [ASPIRIN-ACETAMINOPHEN-CAFFEINE (EXCEDRIN MIGRAINE) 250-250-65 MG PER TABLET] Take 1 tablet by mouth every 6 (six) hours as needed for pain., Disp: , Rfl:      buPROPion (WELLBUTRIN XL) 150 MG 24 hr tablet, Take 150 mg by mouth every morning, Disp: , Rfl:      phentermine (ADIPEX-P) 37.5 MG tablet, [PHENTERMINE (ADIPEX-P) 37.5 MG TABLET] Take 1/2 to 1 tablet in the morning., Disp: 90 tablet, Rfl: 1      We discussed [facility] Bariatric Basics including:  -eating 3 meals daily  -eating protein first  -eating slowly, chewing food well  -avoiding/limiting calorie containing beverages  -choosing wheat, not white with breads, crackers, pastas, jhony, bagels, tortillas, rice  -limiting restaurant or cafeteria eating to twice a week or less  -We discussed the importance of restorative sleep and stress management in maintaining a healthy weight.  -We discussed insulin resistance and glycemic index as it relates to appetite and weight control  -We discussed the National Weight Control Registry healthy weight maintenance strategies and ways to optimize metabolism.  -We discussed the importance of physical activity including cardiovascular and strength training in maintaining a healthier weight and explored viable options.    Most recent labs:    Lab Results   Component Value Date    CHOL 226 (H) 02/28/2014     Lab Results   Component Value Date    HDL 42 (L) 11/28/2017       Lab Results   Component Value Date    TRIG 129 02/28/2014         DIETARY HISTORY  Meals Per Day:  3  Eating Protein First?: yes  Food Diary: B:coffe and eggs with lim cheese sandwiich with WW L:fast food twice a week D:fetuccini yane with shrimp and broccoli  Snacks Per Day: no  Typical Snack:   Fluid Intake: water   Portion Control: improved  Calorie Containing Beverages: can of coke or McDonalds.  Alcohol per week: 2-3  Typical Protein Food Choices: lean  Choosing Whole Grains: yes sometiems  Grocery Shopping is done by: herself  Food Preparation is done by: herself  Meals at Restaurant/Cafeteria/Take Out Per Week: up this past month 4-5 X  Eating at the Table: yes  TV is Off During Meals: yes    Positive Changes Since Last Visit: maintaining a weight loss  Struggling With: new relationship and eating out more    Knowledgeable in Reading Food Labels: yes  Getting Adequate Protein: yes  Sleeping 7-8 hours/day yes  Stress management getting outside    PHYSICAL ACTIVITY PATTERNS:  Cardiovascular: walking more  Strength Training: had a gym membership    REVIEW OF SYSTEMS  GENERAL/CONSTITUTIONAL:  Fatigue: no  CARDIOVASCULAR:  Chest Pain with Exertion: no  PULMONARY:  Dyspnea on exertion: no  CPAP Use: no  Tobacco Use: never  GASTROINTESTINAL:  GERD/Heartburn: no  UROLOGIC:  Urinary Symptoms: no  NEUROLOGIC:  Headaches: occ  Paresthesias: no  PSYCHIATRIC:  Moods: euthymic on Wellbutrin  MUSCULOSKELETAL/RHEUMATOLOGIC  Arthralgias: no  Myalgias: no  ENDOCRINE:  Monitoring Blood Sugars: no  Sugars Well Controlled: yes  DERMATOLOGIC:  Rashes: no    PHYSICAL EXAM: (most recent vitals and today's stated weight)  Vitals: Ht 1.524 m (5')   Wt 77.1 kg (170 lb)   BMI 33.20 kg/m        GEN: Pleasant and in no acute distress  PSYCH: A&OX3,     I have reviewed the note as documented above.  This accurately captures the substance of my conversation with the patient.  Thank you for the opportunity to participate in the care of your patient.    Martine Childers MD, FAAFP  [facility]  Diplomate,  American Board of Obesity Medicine    Total time spent on the date of this encounter doing: chart review, review of test results, patient visit, physical exam, education, counseling, developing plan of care, and documenting = 25 minutes.          Video-Visit Details    Type of service:  Video Visit    Platform used for Video Visit: [employer]    Video End Time (time video stopped): 1:25    Originating Location (pt. Location): Home        Distant Location (provider location):  On-site    Distant Location (provider location):  [facility] SURGERY [facility] AND BARIATRICS CARE [place]       Again, thank you for allowing me to participate in the care of your patient.        Sincerely,        Martine Childers MD

## 2023-06-16 NOTE — PROGRESS NOTES
"Viviana Dumont is 32 year old  female who presents for a billable video visit today.    How would you like to obtain your AVS? MyChart  If dropped from the video visit, the video invitation should be resent by: Text to cell phone: [phone]  Will anyone else be joining your video visit? No      Video Start Time: 1:00    Are there any specific questions or needs that you would like addressed at your visit today? Phentermine refill   Bariatric Follow-up    32 year old  female BMI:Body mass index is 33.2 kg/m .    Weight:   Wt Readings from Last 1 Encounters:   06/16/23 77.1 kg (170 lb)    pounds    Comorbidities:  Patient Active Problem List   Diagnosis     Migraine Headache     Midback Pain     Fainting (Syncope)     Mood Disorder Of Unknown (Axis III) Etiology     Low back pain     Fatty liver     Dyslipidemia     Elevated liver enzymes     Headache, migraine     Chronic constipation     Metabolic syndrome     Insulin resistance     Acanthosis nigricans     Family history of diabetes mellitus type II     Symptomatic cholelithiasis     Obesity (BMI 35.0-39.9) with comorbidity (H)         Interim: Doing well. New boyfriend and eating out more so put some weight on. Maintaining a 27# weight loss. Takine 1 tab in the am.Sleeping OK. Bowels moving OK. LDL has normalized with her weight loss and BP and A1c normal. Walking more. Never smoker.    Plan:  DIET  Meal planning and bringing lunches with fruit, veggies, lean protein will help keep LDL down   EXERCISE think of active dates: hiking [place], walking around a lake or the block, biking, and other fun \"dates\". Can do youtube videos or a quick 7 minute workout until able to get back to the gym for strength training   PHARMACOTHERAPY refill phenetrmine    Encouraged RD visit to address meal planning and bringing lunches to reduce frequency of eating out-save eating out for the evening dates and to save money as well.     -We reviewed her medications " and whether associated with weight gain.    Current Outpatient Medications:      aspirin-acetaminophen-caffeine (EXCEDRIN MIGRAINE) 250-250-65 mg per tablet, [ASPIRIN-ACETAMINOPHEN-CAFFEINE (EXCEDRIN MIGRAINE) 250-250-65 MG PER TABLET] Take 1 tablet by mouth every 6 (six) hours as needed for pain., Disp: , Rfl:      buPROPion (WELLBUTRIN XL) 150 MG 24 hr tablet, Take 150 mg by mouth every morning, Disp: , Rfl:      phentermine (ADIPEX-P) 37.5 MG tablet, [PHENTERMINE (ADIPEX-P) 37.5 MG TABLET] Take 1/2 to 1 tablet in the morning., Disp: 90 tablet, Rfl: 1      We discussed [facility] Bariatric Basics including:  -eating 3 meals daily  -eating protein first  -eating slowly, chewing food well  -avoiding/limiting calorie containing beverages  -choosing wheat, not white with breads, crackers, pastas, johny, bagels, tortillas, rice  -limiting restaurant or cafeteria eating to twice a week or less  -We discussed the importance of restorative sleep and stress management in maintaining a healthy weight.  -We discussed insulin resistance and glycemic index as it relates to appetite and weight control  -We discussed the National Weight Control Registry healthy weight maintenance strategies and ways to optimize metabolism.  -We discussed the importance of physical activity including cardiovascular and strength training in maintaining a healthier weight and explored viable options.    Most recent labs:    Lab Results   Component Value Date    CHOL 226 (H) 02/28/2014     Lab Results   Component Value Date    HDL 42 (L) 11/28/2017       Lab Results   Component Value Date    TRIG 129 02/28/2014         DIETARY HISTORY  Meals Per Day: 3  Eating Protein First?: yes  Food Diary: B:coffe and eggs with lim cheese sandwiich with WW L:fast food twice a week D:fetuccini yane with shrimp and broccoli  Snacks Per Day: no  Typical Snack:   Fluid Intake: water   Portion Control: improved  Calorie Containing Beverages: can of coke or  Mc[employer]onalds.  Alcohol per week: 2-3  Typical Protein Food Choices: lean  Choosing Whole Grains: yes sometiems  Grocery Shopping is done by: herself  Food Preparation is done by: herself  Meals at Restaurant/Cafeteria/Take Out Per Week: up this past month 4-5 X  Eating at the Table: yes  TV is Off During Meals: yes    Positive Changes Since Last Visit: maintaining a weight loss  Struggling With: new relationship and eating out more    Knowledgeable in Reading Food Labels: yes  Getting Adequate Protein: yes  Sleeping 7-8 hours/day yes  Stress management getting outside    PHYSICAL ACTIVITY PATTERNS:  Cardiovascular: walking more  Strength Training: had a gym membership    REVIEW OF SYSTEMS  GENERAL/CONSTITUTIONAL:  Fatigue: no  CARDIOVASCULAR:  Chest Pain with Exertion: no  PULMONARY:  Dyspnea on exertion: no  CPAP Use: no  Tobacco Use: never  GASTROINTESTINAL:  GERD/Heartburn: no  UROLOGIC:  Urinary Symptoms: no  NEUROLOGIC:  Headaches: occ  Paresthesias: no  PSYCHIATRIC:  Moods: euthymic on Wellbutrin  MUSCULOSKELETAL/RHEUMATOLOGIC  Arthralgias: no  Myalgias: no  ENDOCRINE:  Monitoring Blood Sugars: no  Sugars Well Controlled: yes  DERMATOLOGIC:  Rashes: no    PHYSICAL EXAM: (most recent vitals and today's stated weight)  Vitals: Ht 1.524 m (5')   Wt 77.1 kg (170 lb)   BMI 33.20 kg/m        GEN: Pleasant and in no acute distress  PSYCH: A&OX3,     I have reviewed the note as documented above.  This accurately captures the substance of my conversation with the patient.  Thank you for the opportunity to participate in the care of your patient.    Martine Childers MD, FAAFP  [facility]  Diplomate, American Board of Obesity Medicine    Total time spent on the date of this encounter doing: chart review, review of test results, patient visit, physical exam, education, counseling, developing plan of care, and documenting = 25 minutes.          Video-Visit Details    Type of service:  Video  Visit    Platform used for Video Visit: [employer]    Video End Time (time video stopped): 1:25    Originating Location (pt. Location): Home        Distant Location (provider location):  On-site    Distant Location (provider location):  [facility] SURGERY CLINIC AND BARIATRICS [facility]

## 2023-07-13 ENCOUNTER — TELEPHONE (OUTPATIENT)
Dept: SURGERY | Facility: CLINIC | Age: 33
End: 2023-07-13

## 2023-07-13 NOTE — TELEPHONE ENCOUNTER
Sent patient a text message video link to connect. Called patient when she was not connected for the video nutrition visit. Left voicemail instructing patient to connect or to call the clinic (phone number provided) to reschedule appointment.    Zhane Wilson RD, LD

## 2024-01-30 ENCOUNTER — MYC REFILL (OUTPATIENT)
Dept: SURGERY | Facility: CLINIC | Age: 34
End: 2024-01-30
Payer: MEDICAID

## 2024-01-30 DIAGNOSIS — E66.3 OVERWEIGHT: ICD-10-CM

## 2024-01-30 DIAGNOSIS — E88.819 INSULIN RESISTANCE: ICD-10-CM

## 2024-01-30 DIAGNOSIS — E88.810 METABOLIC SYNDROME: ICD-10-CM

## 2024-01-31 RX ORDER — PHENTERMINE HYDROCHLORIDE 37.5 MG/1
TABLET ORAL
Qty: 90 TABLET | Refills: 1 | Status: SHIPPED | OUTPATIENT
Start: 2024-01-31 | End: 2024-06-21

## 2024-03-22 ENCOUNTER — VIRTUAL VISIT (OUTPATIENT)
Dept: SURGERY | Facility: CLINIC | Age: 34
End: 2024-03-22
Payer: MEDICAID

## 2024-03-22 VITALS — HEIGHT: 60 IN | BODY MASS INDEX: 34.55 KG/M2 | WEIGHT: 176 LBS

## 2024-03-22 DIAGNOSIS — E66.9 OBESITY (BMI 30-39.9): Primary | ICD-10-CM

## 2024-03-22 PROCEDURE — 99214 OFFICE O/P EST MOD 30 MIN: CPT | Mod: 95 | Performed by: FAMILY MEDICINE

## 2024-03-22 NOTE — PROGRESS NOTES
Virtual Visit Details    Type of service:  Video Visit   Video Start Time: 10:01  Video End Time: 10:31    Originating Location (pt. Location): Home    Distant Location (provider location):  On-site  Platform used for Video Visit: [employer]    Bariatric Follow-up    33 year old  female BMI:Body mass index is 34.37 kg/m .    Weight:   Wt Readings from Last 1 Encounters:   03/22/24 79.8 kg (176 lb)    pounds    Comorbidities:  Patient Active Problem List   Diagnosis    Migraine Headache    Midback Pain    Fainting (Syncope)    Mood Disorder Of Unknown (Axis III) Etiology    Low back pain    Fatty liver    Dyslipidemia    Elevated liver enzymes    Headache, migraine    Chronic constipation    Metabolic syndrome    Insulin resistance    Acanthosis nigricans    Family history of diabetes mellitus type II    Symptomatic cholelithiasis    Obesity (BMI 35.0-39.9) with comorbidity (H)         Interim: Weight is stable at 21# down. Struggling with consistency. Eating out daily, does not feel she eats too much. Feels it is easier and cheaper to eat out.    Sleep deprivation is affecting her weight  Lim, White Enriched breads, Soda, and frequent eating out are inhibiting weight loss    Plan:  DIET  Recommend 3 meals, protein first, wheat not white, limiting lim and other saturated fats, less frequent eating out   EXERCISE stay intentional, start counting steps again   PHARMACOTHERAPY refill phentermine in Jan. Will take natural alternative to metformin    discussed opportunities for improved health listed above. She feels she could start counting her steps and change white to wheat     -We reviewed her medications and whether associated with weight gain.    Current Outpatient Medications:     aspirin-acetaminophen-caffeine (EXCEDRIN MIGRAINE) 250-250-65 mg per tablet, [ASPIRIN-ACETAMINOPHEN-CAFFEINE (EXCEDRIN MIGRAINE) 250-250-65 MG PER TABLET] Take 1 tablet by mouth every 6 (six) hours as needed for pain., Disp: , Rfl:  "    buPROPion (WELLBUTRIN XL) 150 MG 24 hr tablet, Take 150 mg by mouth every morning, Disp: , Rfl:     phentermine (ADIPEX-P) 37.5 MG tablet, [PHENTERMINE (ADIPEX-P) 37.5 MG TABLET] Take 1/2 to 1 tablet in the morning., Disp: 90 tablet, Rfl: 1      We discussed [facility] Bariatric Basics including:  -eating 3 meals daily  -eating protein first  -eating slowly, chewing food well  -avoiding/limiting calorie containing beverages  -choosing wheat, not white with breads, crackers, pastas, jhony, bagels, tortillas, rice  -limiting restaurant or cafeteria eating to twice a week or less  -We discussed the importance of restorative sleep and stress management in maintaining a healthy weight.  -We discussed insulin resistance and glycemic index as it relates to appetite and weight control  -We discussed the National Weight Control Registry healthy weight maintenance strategies and ways to optimize metabolism.  -We discussed the importance of physical activity including cardiovascular and strength training in maintaining a healthier weight and explored viable options.    Most recent labs:    Lab Results   Component Value Date    CHOL 226 (H) 02/28/2014     Lab Results   Component Value Date    HDL 42 (L) 11/28/2017     Lab Results   Component Value Date    TRIG 129 02/28/2014         DIETARY HISTORY  Meals Per Day: 1-2  Eating Protein First?: sometimes  Food Diary: B:coffee and breakfast sandwich with lim bagel, eggs, cheese, out for breakfast or skips L:steaks and chicken, shrimp, fish,  salads and veggies, rice, tacos D:same as lunch  Snacks Per Day: occ  Typical Snack: see above   Fluid Intake: intentional  Portion Control: stable  Calorie Containing Beverages: regular Coke  Alcohol per week: 0-2  Typical Protein Food Choices: see above  Choosing Whole Grains: no  Grocery Shopping is done by: she does  Food Preparation is done by: she does  Meals at Restaurant/Cafeteria/Take Out Per Week: \"all of the time\" \"every " "day\"  Eating at the Table:   TV is Off During Meals:     Positive Changes Since Last Visit: maintaining 21# weight loss  Struggling With: plateau    Knowledgeable in Reading Food Labels:   Getting Adequate Protein: yes  Sleeping 7-8 hours/day 4-6 hours  Stress management OK    PHYSICAL ACTIVITY PATTERNS:  Cardiovascular: none  Strength Training: none    REVIEW OF SYSTEMS  See above  PHYSICAL EXAM: (most recent vitals and today's stated weight)  Vitals: Ht 1.524 m (5')   Wt 79.8 kg (176 lb)   BMI 34.37 kg/m        GEN: Pleasant and in no acute distress  PSYCH: A&OX3,     I have reviewed the note as documented above.  This accurately captures the substance of my conversation with the patient.  Thank you for the opportunity to participate in the care of your patient.    Martine Childers MD, FAAFP  [facility]  Diplomate, American Board of Obesity Medicine    Total time spent on the date of this encounter doing: chart review, review of test results, patient visit, physical exam, education, counseling, developing plan of care, and documenting = 30 minutes.      "

## 2024-03-22 NOTE — PATIENT INSTRUCTIONS
[facility] Bariatric Basics    Remember to:    -Eat 3 meals a day (not 2, not 5) Chew your food well/SLOW down  -Eat your protein first  -Be a water drinker/Minize liquid calories (no regular pop, no juice) skim or 1% milk OK  -Sleep 7-8 hours each night. Address sleep if problematic  -Stress management is important. Address if problematic  -Move-8000 steps daily Muscle: maintain your muscle mass (strength training 2X/wk)  -Wheat, not white (bread, pasta, crackers, jhony, bagels, tortillas, rice)  -Limit restaurant, cafeteria, take out, drive through to 2 times per week or less  -Minimize caffeine, alcohol, and night-time snacking  -Consider keeping a food diary (i.e. My Fitness Pal, Lose It, or other food tracker)  -Follow up with the dietitian      **Some lean proteins: chicken, turkey, tuna, salmon, crab, fish, shrimp, scallops, lobster, lean cuts of beef and pork, luncheon meats, veggie burgers, beans (black, lima, garbanzo, garcia, kidney, refried), chile, cottage cheese, string cheese, other cheese, eggs, tofu, peanut butter, nuts, vegan crumbles, greek yogurt

## 2024-03-22 NOTE — LETTER
3/22/2024         RE: Viviana Dumont  [address]  [place] [address]        Dear Colleague,    Thank you for referring your patient, Viviana Dumont, to the [facility] [place]. Please see a copy of my visit note below.    Virtual Visit Details    Type of service:  Video Visit   Video Start Time: 10:01  Video End Time: 10:31    Originating Location (pt. Location): Home    Distant Location (provider location):  On-site  Platform used for Video Visit: [facility]    Bariatric Follow-up    33 year old  female BMI:Body mass index is 34.37 kg/m .    Weight:   Wt Readings from Last 1 Encounters:   03/22/24 79.8 kg (176 lb)    pounds    Comorbidities:  Patient Active Problem List   Diagnosis     Migraine Headache     Midback Pain     Fainting (Syncope)     Mood Disorder Of Unknown (Axis III) Etiology     Low back pain     Fatty liver     Dyslipidemia     Elevated liver enzymes     Headache, migraine     Chronic constipation     Metabolic syndrome     Insulin resistance     Acanthosis nigricans     Family history of diabetes mellitus type II     Symptomatic cholelithiasis     Obesity (BMI 35.0-39.9) with comorbidity (H)         Interim: Weight is stable at 21# down. Struggling with consistency. Eating out daily, does not feel she eats too much. Feels it is easier and cheaper to eat out.    Sleep deprivation is affecting her weight  Lim, White Enriched breads, Soda, and frequent eating out are inhibiting weight loss    Plan:  DIET  Recommend 3 meals, protein first, wheat not white, limiting lim and other saturated fats, less frequent eating out   EXERCISE stay intentional, start counting steps again   PHARMACOTHERAPY refill phentermine in Jan. Will take natural alternative to metformin    discussed opportunities for improved health listed above. She feels she could start counting her steps and change white to wheat     -We reviewed her medications  and whether associated with weight gain.    Current Outpatient Medications:      aspirin-acetaminophen-caffeine (EXCEDRIN MIGRAINE) 250-250-65 mg per tablet, [ASPIRIN-ACETAMINOPHEN-CAFFEINE (EXCEDRIN MIGRAINE) 250-250-65 MG PER TABLET] Take 1 tablet by mouth every 6 (six) hours as needed for pain., Disp: , Rfl:      buPROPion (WELLBUTRIN XL) 150 MG 24 hr tablet, Take 150 mg by mouth every morning, Disp: , Rfl:      phentermine (ADIPEX-P) 37.5 MG tablet, [PHENTERMINE (ADIPEX-P) 37.5 MG TABLET] Take 1/2 to 1 tablet in the morning., Disp: 90 tablet, Rfl: 1      We discussed [facility] Bariatric Basics including:  -eating 3 meals daily  -eating protein first  -eating slowly, chewing food well  -avoiding/limiting calorie containing beverages  -choosing wheat, not white with breads, crackers, pastas, jhony, bagels, tortillas, rice  -limiting restaurant or cafeteria eating to twice a week or less  -We discussed the importance of restorative sleep and stress management in maintaining a healthy weight.  -We discussed insulin resistance and glycemic index as it relates to appetite and weight control  -We discussed the National Weight Control Registry healthy weight maintenance strategies and ways to optimize metabolism.  -We discussed the importance of physical activity including cardiovascular and strength training in maintaining a healthier weight and explored viable options.    Most recent labs:    Lab Results   Component Value Date    CHOL 226 (H) 02/28/2014     Lab Results   Component Value Date    HDL 42 (L) 11/28/2017     Lab Results   Component Value Date    TRIG 129 02/28/2014         DIETARY HISTORY  Meals Per Day: 1-2  Eating Protein First?: sometimes  Food Diary: B:coffee and breakfast sandwich with lim bagel, eggs, cheese, out for breakfast or skips L:steaks and chicken, shrimp, fish,  salads and veggies, rice, tacos D:same as lunch  Snacks Per Day: occ  Typical Snack: see above   Fluid Intake:  "intentional  Portion Control: stable  Calorie Containing Beverages: regular Coke  Alcohol per week: 0-2  Typical Protein Food Choices: see above  Choosing Whole Grains: no  Grocery Shopping is done by: she does  Food Preparation is done by: she does  Meals at Restaurant/Cafeteria/Take Out Per Week: \"all of the time\" \"every day\"  Eating at the Table:   TV is Off During Meals:     Positive Changes Since Last Visit: maintaining 21# weight loss  Struggling With: plateau    Knowledgeable in Reading Food Labels:   Getting Adequate Protein: yes  Sleeping 7-8 hours/day 4-6 hours  Stress management OK    PHYSICAL ACTIVITY PATTERNS:  Cardiovascular: none  Strength Training: none    REVIEW OF SYSTEMS  See above  PHYSICAL EXAM: (most recent vitals and today's stated weight)  Vitals: Ht 1.524 m (5')   Wt 79.8 kg (176 lb)   BMI 34.37 kg/m        GEN: Pleasant and in no acute distress  PSYCH: A&OX3,     I have reviewed the note as documented above.  This accurately captures the substance of my conversation with the patient.  Thank you for the opportunity to participate in the care of your patient.    Martine Childers MD, FAAFP  [facility]  Diplomate, American Board of Obesity Medicine    Total time spent on the date of this encounter doing: chart review, review of test results, patient visit, physical exam, education, counseling, developing plan of care, and documenting = 30 minutes.        Again, thank you for allowing me to participate in the care of your patient.        Sincerely,        Martine Childers MD  "

## 2024-06-21 ENCOUNTER — VIRTUAL VISIT (OUTPATIENT)
Dept: SURGERY | Facility: CLINIC | Age: 34
End: 2024-06-21
Payer: COMMERCIAL

## 2024-06-21 VITALS — HEIGHT: 60 IN | WEIGHT: 175 LBS | BODY MASS INDEX: 34.36 KG/M2

## 2024-06-21 DIAGNOSIS — E88.810 METABOLIC SYNDROME: ICD-10-CM

## 2024-06-21 DIAGNOSIS — E66.3 OVERWEIGHT: ICD-10-CM

## 2024-06-21 DIAGNOSIS — E88.819 INSULIN RESISTANCE: ICD-10-CM

## 2024-06-21 DIAGNOSIS — E66.9 OBESITY (BMI 30-39.9): Primary | ICD-10-CM

## 2024-06-21 DIAGNOSIS — R11.0 NAUSEA: ICD-10-CM

## 2024-06-21 PROCEDURE — 99214 OFFICE O/P EST MOD 30 MIN: CPT | Mod: 95 | Performed by: FAMILY MEDICINE

## 2024-06-21 RX ORDER — SEMAGLUTIDE 1 MG/.5ML
1 INJECTION, SOLUTION SUBCUTANEOUS WEEKLY
Qty: 2 ML | Refills: 0 | Status: SHIPPED | OUTPATIENT
Start: 2024-06-21 | End: 2024-07-19

## 2024-06-21 RX ORDER — ONDANSETRON 4 MG/1
4 TABLET, ORALLY DISINTEGRATING ORAL EVERY 8 HOURS PRN
Qty: 60 TABLET | Refills: 1 | Status: SHIPPED | OUTPATIENT
Start: 2024-06-21 | End: 2024-09-19

## 2024-06-21 RX ORDER — SEMAGLUTIDE 1.7 MG/.75ML
1.7 INJECTION, SOLUTION SUBCUTANEOUS WEEKLY
Qty: 3 ML | Refills: 0 | Status: SHIPPED | OUTPATIENT
Start: 2024-06-21 | End: 2024-07-19

## 2024-06-21 RX ORDER — SEMAGLUTIDE 2.4 MG/.75ML
2.4 INJECTION, SOLUTION SUBCUTANEOUS WEEKLY
Qty: 9 ML | Refills: 3 | Status: SHIPPED | OUTPATIENT
Start: 2024-06-21 | End: 2025-06-21

## 2024-06-21 RX ORDER — SEMAGLUTIDE 0.25 MG/.5ML
0.25 INJECTION, SOLUTION SUBCUTANEOUS WEEKLY
Qty: 2 ML | Refills: 0 | Status: SHIPPED | OUTPATIENT
Start: 2024-06-21 | End: 2024-07-19

## 2024-06-21 RX ORDER — SEMAGLUTIDE 0.5 MG/.5ML
0.5 INJECTION, SOLUTION SUBCUTANEOUS WEEKLY
Qty: 2 ML | Refills: 0 | Status: SHIPPED | OUTPATIENT
Start: 2024-06-21 | End: 2024-07-19

## 2024-06-21 RX ORDER — PHENTERMINE HYDROCHLORIDE 37.5 MG/1
TABLET ORAL
Qty: 90 TABLET | Refills: 1 | Status: SHIPPED | OUTPATIENT
Start: 2024-06-21

## 2024-06-21 ASSESSMENT — PAIN SCALES - GENERAL: PAINLEVEL: NO PAIN (0)

## 2024-06-21 NOTE — LETTER
6/21/2024      Viviana Dumont  [address]  [place] [address]      Dear Colleague,    Thank you for referring your patient, Viviana Dumont, to the [facility] [place]. Please see a copy of my visit note below.    Bariatric Follow-up    33 year old  female BMI:Body mass index is 34.18 kg/m .    Weight:   Wt Readings from Last 1 Encounters:   06/21/24 79.4 kg (175 lb)    pounds    Comorbidities:  Patient Active Problem List   Diagnosis     Migraine Headache     Midback Pain     Fainting (Syncope)     Mood Disorder Of Unknown (Axis III) Etiology     Low back pain     Fatty liver     Dyslipidemia     Elevated liver enzymes     Headache, migraine     Chronic constipation     Metabolic syndrome     Insulin resistance     Acanthosis nigricans     Family history of diabetes mellitus type II     Symptomatic cholelithiasis     Obesity (BMI 35.0-39.9) with comorbidity (H)         Current Outpatient Medications:      aspirin-acetaminophen-caffeine (EXCEDRIN MIGRAINE) 250-250-65 mg per tablet, [ASPIRIN-ACETAMINOPHEN-CAFFEINE (EXCEDRIN MIGRAINE) 250-250-65 MG PER TABLET] Take 1 tablet by mouth every 6 (six) hours as needed for pain., Disp: , Rfl:      ondansetron (ZOFRAN ODT) 4 MG ODT tab, Take 1 tablet (4 mg) by mouth every 8 hours as needed for nausea, Disp: 60 tablet, Rfl: 1     phentermine (ADIPEX-P) 37.5 MG tablet, [PHENTERMINE (ADIPEX-P) 37.5 MG TABLET] Take 1/2 to 1 tablet in the morning., Disp: 90 tablet, Rfl: 1     Semaglutide-Weight Management (WEGOVY) 0.25 MG/0.5ML pen, Inject 0.25 mg Subcutaneous once a week for 28 days Please notify patient when available. Thank you, Disp: 2 mL, Rfl: 0     Semaglutide-Weight Management (WEGOVY) 0.5 MG/0.5ML pen, Inject 0.5 mg Subcutaneous once a week for 28 days Please notify patient when available. Thank you, Disp: 2 mL, Rfl: 0     Semaglutide-Weight Management (WEGOVY) 1 MG/0.5ML pen, Inject 1 mg Subcutaneous once  a week for 28 days Please notify patient when available. Thank you, Disp: 2 mL, Rfl: 0     Semaglutide-Weight Management (WEGOVY) 1.7 MG/0.75ML pen, Inject 1.7 mg Subcutaneous once a week for 28 days Please notify patient when available. Thank you, Disp: 3 mL, Rfl: 0     Semaglutide-Weight Management (WEGOVY) 2.4 MG/0.75ML pen, Inject 2.4 mg Subcutaneous once a week Please notify patient when available. Thank you, Disp: 9 mL, Rfl: 3     buPROPion (WELLBUTRIN XL) 150 MG 24 hr tablet, Take 150 mg by mouth every morning, Disp: , Rfl:        Interim: High weight was 220#, initial here 197# then re-start at 203# Weight is stable at 175# so maintaining an 28#, 45# down from her high of 220#More active. Counting her steps. Getting 6K steps. Plans to run. Joined the gym. Still struggles with meal skipping. Eating out most meals.     Cholesterol   Date Value Ref Range Status   02/28/2014 226 (H) <200 mg/dL Final      Hemoglobin A1C   Date Value Ref Range Status   11/28/2017 5.3 4.2 - 6.1 % Final      BP Readings from Last 3 Encounters:   03/14/23 126/72   09/15/22 104/70        Plan:  DIET  Meal planning and avoiding the coca cola   EXERCISE continue counting steps, add resistance when at gym   PHARMACOTHERAPY continue phentermine. Did not like metformin.     discussed metabolics, BP, chol and blood sugard look good. Continue stress management and protect your sleep.      -We reviewed her medications and whether associated with weight gain.        We discussed [facility] Bariatric Basics including:  -eating 3 meals daily  -eating protein first  -eating slowly, chewing food well  -avoiding/limiting calorie containing beverages  -choosing wheat, not white with breads, crackers, pastas, jhony, bagels, tortillas, rice  -limiting restaurant or cafeteria eating to twice a week or less  -We discussed the importance of restorative sleep and stress management in maintaining a healthy weight.  -We discussed insulin resistance  and glycemic index as it relates to appetite and weight control  -We discussed the National Weight Control Registry healthy weight maintenance strategies and ways to optimize metabolism.  -We discussed the importance of physical activity including cardiovascular and strength training in maintaining a healthier weight and explored viable options.      DIETARY HISTORY  Meals Per Day: 2 trying to get 3  Eating Protein First?: yes  Food Diary: B:eggs with meat or lim sandwich with coffee L:out at ch[employer] cheese, pizza or soup and salad or tacos D:[employer] [employer]. NO fruit  Snacks Per Day: no veggies except when boyfriend makes a snack, no fruit.   Typical Snack: shredded carrots or cucumbers  Fluid Intake: better  Portion Control: improved  Calorie Containing Beverages: soda regular coke sometimes  Alcohol per week: sometimes  Typical Protein Food Choices: a lot of beef and pork some chicken, beans, eggs,  Choosing Whole Grains: sometimes  Grocery Shopping is done by: her boyfriend  Food Preparation is done by: her boyfriend  Meals at Restaurant/Cafeteria/Take Out Per Week:  most meals  Eating at the Table:   TV is Off During Meals:     Positive Changes Since Last Visit: maintaining 45# weight los from when she was 220#  Struggling With: eating out    Knowledgeable in Reading Food Labels: yes  Getting Adequate Protein: walking more not plans to run  Sleeping 7-8 hours/day 4-8  Stress management OK    PHYSICAL ACTIVITY PATTERNS:  Cardiovascular: walking more 6K steps plans to run  Strength Training: was doing PT, goal is gym 3 times a week. Is familiar with weights    REVIEW OF SYSTEMS  above  PHYSICAL EXAM: (most recent vitals and today's stated weight)  Vitals: Ht 1.524 m (5')   Wt 79.4 kg (175 lb)   BMI 34.18 kg/m        GEN: Pleasant and in no acute distress  PSYCH: A&OX3,     I have reviewed the note as documented above.  This accurately captures the substance of my conversation with the patient.  Thank you for  the opportunity to participate in the care of your patient.    Martine Childers MD, FAAFP  [facility]  Diplomate, American Board of Obesity Medicine    Total time spent on the date of this encounter doing: chart review, review of test results, patient visit, physical exam, education, counseling, developing plan of care, and documenting = 30 minutes.        Virtual Visit Details    Type of service:  Video Visit   Video Start Time:  9:08  Video End Time: 9:38    Originating Location (pt. Location): Home    Distant Location (provider location):  On-site  Platform used for Video Visit: Katelynn      Again, thank you for allowing me to participate in the care of your patient.        Sincerely,        Martine Childers MD

## 2024-06-21 NOTE — PATIENT INSTRUCTIONS
Nausea and constipation are the most common side effects with the GLP-1/GIP medications.     Drinking water throughout the day, preventing and or treating constipation, and eating 3 nutrient dense meals containing protein is important while on GLP-1 RA/GIP medications. It can mitigate these side effects.     For Prevention and Treatment of Constipation    From least aggressive to most aggressive:    Move: wallking-the more we move, the more our bowels move  Water: Drink water-64oz+ day  Go when you need to go. Don't wait. The longer you wait, the harder it gets.  Fiber: Fruit, raw veggies, nuts, whole grains,   Stool Softeners: if constipation is mild and for maintenance  Gentle laxatives: Miralax, senokot, dulcolax , Smooth move tea as needed    More aggressive (and typically won't get to this point)  Milk of Magnesia  Mag Citrate (what you drink before a colonoscopy)  Suppositories  Enema

## 2024-06-21 NOTE — PROGRESS NOTES
Bariatric Follow-up    33 year old  female BMI:Body mass index is 34.18 kg/m .    Weight:   Wt Readings from Last 1 Encounters:   06/21/24 79.4 kg (175 lb)    pounds    Comorbidities:  Patient Active Problem List   Diagnosis    Migraine Headache    Midback Pain    Fainting (Syncope)    Mood Disorder Of Unknown (Axis III) Etiology    Low back pain    Fatty liver    Dyslipidemia    Elevated liver enzymes    Headache, migraine    Chronic constipation    Metabolic syndrome    Insulin resistance    Acanthosis nigricans    Family history of diabetes mellitus type II    Symptomatic cholelithiasis    Obesity (BMI 35.0-39.9) with comorbidity (H)         Current Outpatient Medications:     aspirin-acetaminophen-caffeine (EXCEDRIN MIGRAINE) 250-250-65 mg per tablet, [ASPIRIN-ACETAMINOPHEN-CAFFEINE (EXCEDRIN MIGRAINE) 250-250-65 MG PER TABLET] Take 1 tablet by mouth every 6 (six) hours as needed for pain., Disp: , Rfl:     ondansetron (ZOFRAN ODT) 4 MG ODT tab, Take 1 tablet (4 mg) by mouth every 8 hours as needed for nausea, Disp: 60 tablet, Rfl: 1    phentermine (ADIPEX-P) 37.5 MG tablet, [PHENTERMINE (ADIPEX-P) 37.5 MG TABLET] Take 1/2 to 1 tablet in the morning., Disp: 90 tablet, Rfl: 1    Semaglutide-Weight Management (WEGOVY) 0.25 MG/0.5ML pen, Inject 0.25 mg Subcutaneous once a week for 28 days Please notify patient when available. Thank you, Disp: 2 mL, Rfl: 0    Semaglutide-Weight Management (WEGOVY) 0.5 MG/0.5ML pen, Inject 0.5 mg Subcutaneous once a week for 28 days Please notify patient when available. Thank you, Disp: 2 mL, Rfl: 0    Semaglutide-Weight Management (WEGOVY) 1 MG/0.5ML pen, Inject 1 mg Subcutaneous once a week for 28 days Please notify patient when available. Thank you, Disp: 2 mL, Rfl: 0    Semaglutide-Weight Management (WEGOVY) 1.7 MG/0.75ML pen, Inject 1.7 mg Subcutaneous once a week for 28 days Please notify patient when available. Thank you, Disp: 3 mL, Rfl: 0    Semaglutide-Weight Management  (WEGOVY) 2.4 MG/0.75ML pen, Inject 2.4 mg Subcutaneous once a week Please notify patient when available. Thank you, Disp: 9 mL, Rfl: 3    buPROPion (WELLBUTRIN XL) 150 MG 24 hr tablet, Take 150 mg by mouth every morning, Disp: , Rfl:        Interim: High weight was 220#, initial here 197# then re-start at 203# Weight is stable at 175# so maintaining an 28#, 45# down from her high of 220#More active. Counting her steps. Getting 6K steps. Plans to run. Joined the gym. Still struggles with meal skipping. Eating out most meals.     Cholesterol   Date Value Ref Range Status   02/28/2014 226 (H) <200 mg/dL Final      Hemoglobin A1C   Date Value Ref Range Status   11/28/2017 5.3 4.2 - 6.1 % Final      BP Readings from Last 3 Encounters:   03/14/23 126/72   09/15/22 104/70        Plan:  DIET  Meal planning and avoiding the coca cola   EXERCISE continue counting steps, add resistance when at gym   PHARMACOTHERAPY continue phentermine. Did not like metformin.     discussed metabolics, BP, chol and blood sugard look good. Continue stress management and protect your sleep.      -We reviewed her medications and whether associated with weight gain.        We discussed [facility] Bariatric Basics including:  -eating 3 meals daily  -eating protein first  -eating slowly, chewing food well  -avoiding/limiting calorie containing beverages  -choosing wheat, not white with breads, crackers, pastas, jhony, bagels, tortillas, rice  -limiting restaurant or cafeteria eating to twice a week or less  -We discussed the importance of restorative sleep and stress management in maintaining a healthy weight.  -We discussed insulin resistance and glycemic index as it relates to appetite and weight control  -We discussed the National Weight Control Registry healthy weight maintenance strategies and ways to optimize metabolism.  -We discussed the importance of physical activity including cardiovascular and strength training in maintaining a  healthier weight and explored viable options.      DIETARY HISTORY  Meals Per Day: 2 trying to get 3  Eating Protein First?: yes  Food Diary: B:eggs with meat or lim sandwich with coffee L:out at [place] cheese, pizza or soup and salad or tacos D:[employer]. NO fruit  Snacks Per Day: no veggies except when boyfriend makes a snack, no fruit.   Typical Snack: shredded carrots or cucumbers  Fluid Intake: better  Portion Control: improved  Calorie Containing Beverages: soda regular coke sometimes  Alcohol per week: sometimes  Typical Protein Food Choices: a lot of beef and pork some chicken, beans, eggs,  Choosing Whole Grains: sometimes  Grocery Shopping is done by: her boyfriend  Food Preparation is done by: her boyfriend  Meals at Restaurant/Cafeteria/Take Out Per Week:  most meals  Eating at the Table:   TV is Off During Meals:     Positive Changes Since Last Visit: maintaining 45# weight los from when she was 220#  Struggling With: eating out    Knowledgeable in Reading Food Labels: yes  Getting Adequate Protein: walking more not plans to run  Sleeping 7-8 hours/day 4-8  Stress management OK    PHYSICAL ACTIVITY PATTERNS:  Cardiovascular: walking more 6K steps plans to run  Strength Training: was doing PT, goal is gym 3 times a week. Is familiar with weights    REVIEW OF SYSTEMS  above  PHYSICAL EXAM: (most recent vitals and today's stated weight)  Vitals: Ht 1.524 m (5')   Wt 79.4 kg (175 lb)   BMI 34.18 kg/m        GEN: Pleasant and in no acute distress  PSYCH: A&OX3,     I have reviewed the note as documented above.  This accurately captures the substance of my conversation with the patient.  Thank you for the opportunity to participate in the care of your patient.    Martine Childers MD, FAAFP  [facility]  Diplomate, American Board of Obesity Medicine    Total time spent on the date of this encounter doing: chart review, review of test results, patient visit, physical exam,  education, counseling, developing plan of care, and documenting = 30 minutes.

## 2024-06-21 NOTE — NURSING NOTE
Is the patient currently in the state of [place]? YES    Visit mode:VIDEO    If the visit is dropped, the patient can be reconnected by: VIDEO VISIT: Text to cell phone:   Telephone Information:   Mobile [phone]       Will anyone else be joining the visit? NO  (If patient encounters technical issues they should call [phone]    How would you like to obtain your AVS? MyChart    Are changes needed to the allergy or medication list? No    Are refills needed on medications prescribed by this physician? NO    Reason for visit: RECHECK    Arianne PEDROZA

## 2024-06-21 NOTE — PROGRESS NOTES
Virtual Visit Details    Type of service:  Video Visit   Video Start Time:  9:08  Video End Time: 9:38    Originating Location (pt. Location): Home    Distant Location (provider location):  On-site  Platform used for Video Visit: Katelynn

## 2024-06-25 ENCOUNTER — TELEPHONE (OUTPATIENT)
Dept: SURGERY | Facility: CLINIC | Age: 34
End: 2024-06-25
Payer: COMMERCIAL

## 2024-06-25 NOTE — TELEPHONE ENCOUNTER
Prior Authorization Retail Medication Request    Medication/Dose: Wegovy 0.25mg/0.5ml Auto-injectors  New/renewal/insurance change PA/secondary ins. PA:  PreviouslDraulito Tried and Failed:  Diet, Exercise, Meal-Planning, Phentermine.     Key: [id number]    Pharmacy Information (if different than what is on RX)  Name:  [facility]  Phone:  [phone]   Fax:  [phone]

## 2024-06-28 NOTE — TELEPHONE ENCOUNTER
Retail Pharmacy Prior Authorization Team   Phone: [phone]    PA Initiation    Medication: WEGOVY 0.25 MG/0.5ML SC SOAJ  Insurance Company: [employer] - Phone [phone] Fax [phone]  Pharmacy Filling the Rx: [id number] - [place], [place] - [address] [place] [address]  Filling Pharmacy Phone: [phone]  Filling Pharmacy Fax: [phone]  Start Date: 6/28/2024

## 2024-07-09 NOTE — TELEPHONE ENCOUNTER
Prior Authorization Approval    Medication: WEGOVY 0.25 MG/0.5ML SC SOAJ  Authorization Effective Date: 5/22/2024  Authorization Expiration Date: 6/21/2025  Approved Dose/Quantity:   Reference #:     Insurance Company: [employer] - Phone [phone] Fax [phone]  Expected CoPay: $    CoPay Card Available:      Financial Assistance Needed:   Which Pharmacy is filling the prescription: [id number] - [facility] [address] [facility]  Pharmacy Notified: Yes  Patient Notified:

## 2024-07-14 ENCOUNTER — HEALTH MAINTENANCE LETTER (OUTPATIENT)
Age: 34
End: 2024-07-14

## 2024-07-14 NOTE — NURSING NOTE
Is the patient currently in the state of [place]? YES    Visit mode:VIDEO    If the visit is dropped, the patient can be reconnected by: VIDEO VISIT: Text to cell phone:   Telephone Information:   Mobile [phone]       Will anyone else be joining the visit? NO  (If patient encounters technical issues they should call [phone]    How would you like to obtain your AVS? MyChart    Are changes needed to the allergy or medication list? No    Reason for visit: RECHECK    Stephanie PEDROZA       Treatments -   IVF - Lactated Ringers at 150 ml/hr.   Diet - Clear liquid diet.  Consider advancing to low fat diet tomorrow morning if doing better clinically.   Antiemetics - Zofran PRN  Pain control -   Mild Pain - Tylenol  Moderate Pain - Oxycodone 2.5 mg q4h PRN  Severe Pain - Morphine 4 mg IV q4h PRN  Breakthrough pain - Toradol 15 mg IV q6h PRN  Monitor pain levels.   Evaluations -   LFTs within normal limits and no gallstones on imaging.  Sludge or recently passed stone still possible, but would expect some LFT elevation to still be present.   Triglyceride level not elevated.   Alcohol assessment - Denies any and all alcohol use.   Drug assessment -   Omeprazole which he takes at very high doses of 40 mg twice to three times daily is the most likely medication to cause this but he states he needs this for really bed acid reflux.  Reports that he has tried numerous PPIs before.  Should discuss further with his outpatient gastroenterologist.   Salicylic acid can potentiate pancreatitis.  This is another potential cause of this and should be discontinued.  He takes it PRN for back pain as well as NSAIDs.    No signs of infection. And no outright evidence for trauma or ischemia. However, if fever, could consider further evaluation toward this end.   Could check alpha-1 antitrypsin level, as maybe some air trapping on older PFT from 2018 (though not meeting COPD criteria at that time) and also recurrent idiopathic pancreatitis.   Consider GI evaluation outpatient, or, if fails to rapidly improve, as inpatient.   He is hoping to feel better and discharge tomorrow.

## 2024-08-30 ENCOUNTER — TELEPHONE (OUTPATIENT)
Dept: SURGERY | Facility: CLINIC | Age: 34
End: 2024-08-30
Payer: COMMERCIAL

## 2024-08-30 NOTE — TELEPHONE ENCOUNTER
Pt. Has a refill request for Wegovy at [facility] in [place] [phone] would like to conform that the prescription has been sent over. Please contact Pt. At [phone]. Okay to leave a detailed message. Pt. Was on Vacation last week and did not have a phone signal.

## 2024-08-30 NOTE — TELEPHONE ENCOUNTER
Talked with pt and she will call her pharmacy to ask for the next dosage up.     Jyoti Marina RN, CBN  [facility]  P [phone]  F [phone]

## 2024-09-23 ENCOUNTER — VIRTUAL VISIT (OUTPATIENT)
Dept: SURGERY | Facility: CLINIC | Age: 34
End: 2024-09-23
Attending: FAMILY MEDICINE
Payer: COMMERCIAL

## 2024-09-23 DIAGNOSIS — E66.811 CLASS 1 OBESITY DUE TO EXCESS CALORIES WITH BODY MASS INDEX (BMI) OF 34.0 TO 34.9 IN ADULT, UNSPECIFIED WHETHER SERIOUS COMORBIDITY PRESENT: ICD-10-CM

## 2024-09-23 DIAGNOSIS — E66.09 CLASS 1 OBESITY DUE TO EXCESS CALORIES WITH BODY MASS INDEX (BMI) OF 34.0 TO 34.9 IN ADULT, UNSPECIFIED WHETHER SERIOUS COMORBIDITY PRESENT: ICD-10-CM

## 2024-09-23 DIAGNOSIS — E66.9 OBESITY (BMI 30-39.9): Primary | ICD-10-CM

## 2024-09-23 PROCEDURE — 97803 MED NUTRITION INDIV SUBSEQ: CPT | Mod: 95 | Performed by: DIETITIAN, REGISTERED

## 2024-09-23 NOTE — LETTER
9/23/2024      Viviana Dumont  [address]  [place] [address]      Dear Colleague,    Thank you for referring your patient, Viviana Dumont, to the [facility] SURGERY [facility] AND BARIATRICS CARE [place]. Please see a copy of my visit note below.    Viviana Dumont is a 33 year old who is being evaluated via a billable video visit.      How would you like to obtain your AVS? MyChart  If the video visit is dropped, the invitation should be resent by: Text to cell phone: [phone]  Will anyone else be joining your video visit? No        Medical  Weight Loss Follow-Up Diet Evaluation  Assessment:  Viviana is presenting today for a follow up weight management nutrition consultation.  This patient has had an initial appointment and was referred by Dr. Childers for MNT as treatment for Obesity   Weight loss medication: Phentermine. Wegovy.   Pt's weight is 175 lbs (6/21/2024)  Initial weight: 197.5 lbs  Weight change: 22.5 lbs, 11.4% weight loss        6/21/2024     8:55 AM   Changes and Difficulties   I have made the following changes to my diet since my last visit: Tried to eat breakfast more often & drink more water   With regards to my diet, I am still struggling with: I forget to eat some times   I have made the following changes to my activity/exercise since my last visit: Arline joined the [facility] & have been walking     BMI: 34.18  Ideal body weight: 45.5 kg (100 lb 4.9 oz)  Adjusted ideal body weight: 59.1 kg (130 lb 3 oz)    Estimated RMR ([place]-St Jeor equation):   1,410 kcals x 1.2 (sedentary) = 1,692 kcals (for weight maintenance)  1,410 kcals x 1.3 (light active) = 1,939 kcals (for weight maintenance)  Recommended Protein Intake: 60-80 grams of protein/day  Patient Active Problem List:  Patient Active Problem List   Diagnosis     Migraine Headache     Midback Pain     Fainting (Syncope)     Mood Disorder Of Unknown (Axis III) Etiology     Low back pain     Fatty liver      Dyslipidemia     Elevated liver enzymes     Headache, migraine     Chronic constipation     Metabolic syndrome     Insulin resistance     Acanthosis nigricans     Family history of diabetes mellitus type II     Symptomatic cholelithiasis     Obesity (BMI 35.0-39.9) with comorbidity (H)   Diabetes: no A1C of 5.3% on 5/19/2024    Progress on goals from last visit: Patient stated that the first 24 hours she has issues with nausea and tiredness. Patient does not have a set routine with her oral intake - will commonly have 2 meals/day. Patient feels like she forgets to eat and looking for quick protein options. Patient does not feel like she is having a false sense of fullness from meals. Discussed how an empty stomach and increase nausea. Patient stated that she knows what she needs to do but procrastinates and does not complete it. Encouraged patient to use external reminders to help her create a routine with eating - stated that she has her boyfriend remind her to eat and will just turn off alarms on her phone if she used that.   Does not like the texture of certain foods    Dietary Recall:  Breakfast: skips often  Only eating lunch and dinner most days  Eating out: will eat out frequently during the week  Beverages:   Coffee  Pop  Water  Exercise:   Trying to create a routine with her daughter - has a gym membership  Nutrition Diagnosis:    Overweight/Obesity (NC 3.3) related to overeating and poor lifestyle habits as evidenced by patient report of no eating routine, lack of vegetable and fruit intake. and protein intake and BMI 34.18      Intervention:  Food and/or nutrient delivery: trying to create a routine with eating and exercise. Aiming to have three meals per day.   Nutrition education: High protein cereals and meal ideas    Monitoring/Evaluation:    Goals:  Aim for three meals per day  Aim to have 20+ grams of protein with meals    Patient to follow up in 4 month(s) with bariatrician and PRN with  ADILENE      Video-Visit Details    Type of service:  Video Visit    Video Start Time (time video started): 1:13 PM    Video End Time (time video stopped): 1:27 PM    Originating Location (pt. Location): Home      Distant Location (provider location):  On-site    Mode of Communication:  Video Conference via [facility]    Physician has received verbal consent for a Video Visit from the patient? Yes      Saba Car RD           Again, thank you for allowing me to participate in the care of your patient.        Sincerely,        Saba Car RD

## 2024-09-23 NOTE — PATIENT INSTRUCTIONS
High Protein Cereal Options:   Kashi Go: 9-14 g protein per serving (depending on flavor)  Premier Protein Cereal: 20 g protein per serving  Special K High Protein: 10 - 20 g per serving  Magic Spoon: 12-14 g per serving (depending on flavor)  :ratio Brand: 10 g protein per serving  Cataljordan Crunch: 9 g per serving     On-the-Go Breakfast Ideas  As of 2015, the latest research shows what a huge impact eating breakfast has on losing weight and feeling your best. People lose more weight when they make breakfast their biggest meal of the day compared to Dinner, but even if you cannot go to that degree, getting a breakfast that has at least 20 grams of protein and even a moderate amount of fat is ideal for maintaining good energy through the day and limits overeating in the evening hours.  The following are some quick and easy suggestions for at least getting something of substance into your body in the morning.  Enjoy!    Eating breakfast within 90 minutes of waking up is an important part of taking care of your body on a restricted calorie diet plan.  After sleeping for hours, your body is in need of fuel.  An ideal breakfast is a combination of protein, whole-grain carbohydrates, or fruit.  Here s why:    -Protein digests very slowly in the body, helping you feel more satisfied.  -Whole grains provide dietary fiber, which also digests slowly and helps keep your gut clean.  -Fruit is a great source of vitamins, minerals, and fiber.     Each one of these breakfast combinations has between 200-300 calories and 15-20 grams of protein.  Feel free to mix and match!    Bone Broth (chicken bone broth or beef bone broth) is a great way to boost protein content. 8oz of bone broth will typically have 9-12grams of protein for 40kcal of energy.    Protein: Choose  -1/2 cup low-fat cottage cheese  -2 hard boiled eggs , or one cooked in olive oil (low/slow heat).  -1 low fat string cheese stick  -1 Tablespoon natural peanut  butter  -[employer] vegetarian sausage valarie (found in freezer section)  -1 slice lowfat cheese  -6 oz 2% or lowfat Greek yogurt, such as Fage or Oikos.    PLUS    Whole Grains:  Choose   -1 whole wheat English muffin  -1 whole wheat jhony, half  -1/2 Fiber One frozen muffin, thawed  -1/2 Fiber One toaster pastry  -1 whole wheat bagel thin  -1/2 cup Kashi cereal  -1 Kashi waffle (or other whole grain high-fiber waffle)  Aim for whole grain/sprouted breads with at least 3g of fiber/slice if having bread. [place] is one such brand.    OR    Fruit: Choose  -1/3 cup blueberries  -1/2 banana (or a plantain- similar to a banana, yet smaller)  -1/2 cup cantaloupe cubes  -1 small apple  -1 small orange  -1/2 cup strawberries  -handful raspberries/blackberries (each berry is about 1 calorie).    *Adapted from Diabetes Living, Fall 20    Ten Breakfasts Under 250 calories    Ideally, getting between 350-600 calories  (depending on starting height and weight)for breakfast is ideal for avoiding hunger later in the day, adjust/add to the following accordingly:    One- 250 calories, 8.5 g protein  1 slice whole-grain toast   1 Tbsp peanut butter    banana    Two- 250 calories, 8 g protein    cup nonfat/lowfat yogurt  1/3rd cup diced no-sugar peaches  1/3rd cup cereal (like Special K, Cheerios, or bran flakes)    Three- 250 calories, 25 g protein  1 egg scrambled with 1 oz skim milk    cup shredded cheddar    whole grain English muffin  1 oz [place] lim  1 tsp margarine spread    Four- 225 calories, 25 g protein  1/2 cup Kashi Go-Lean cereal    cup skim milk mixed with 1 scoop Bariatric Advantage protein powder    cup no-sugar diced pears    Five- 250 calories, 20 g protein    cup oatmeal prepared with skim milk, 1 scoop protein powder, and sugar-free maple syrup    Six- 200 calories, 5 g protein  1 whole grain waffle, toasted  1 tablespoon creamy peanut or almond butter    Seven-  250 calories, 19 g  protein  Breakfast sandwich: 1 slice whole grain toast, cut in half.  Add 1 scrambled egg and one slice cheddar  cheese.    Eight-  250 calories, 15 g protein  2 eggs scrambled with 1/3 cup frozen spinach (heat before adding to eggs) and 2 tablespoons low fat cream cheese.    Nine-  150 calories, 15 g protein  2/3rd cup cottage cheese    cup cantaloupe    Ten- 200 calories, 20 g protein  Fruit smoothie made with 4 oz. nonfat Greek yogurt,   cup berries, 1 scoop protein powder, and 4 oz skim milk.    Ten Lunches Under 250 Calories    Aim for lunch to be around 300-400 calories a day when trying to lose weight and get that protein in!    One- 200 calories, 11 g protein  1/3 cup tuna salad made with light santa on 1 slice whole grain bread  1 small peeled apple    Two- 250 calories, 16 g protein  1/3 cup lowfat cottage cheese    cup cooked green beans    small fruit cocktail (in natural juice)    Three- 200 calories, 11 g protein    grilled cheese sandwich on whole grain bread with lowfat cheese  2/3rd cup of tomato soup    Four- 250 calories, 22 g protein  Deli wrap: 1 oz sliced turkey, 1 oz sliced ham, 1 oz sliced chicken rolled up with 1 slice low-fat cheese  1 small orange    Five- 250 calories, 28 g protein  2/3rd cup chili with 1 oz shredded cheese  4 saltine crackers    Six- 250 calories, 22 g protein  1 cup fresh spinach with 2 oz chicken, 1/3rd cup mandarin oranges, and 2 tablespoons sliced almonds with 1 tablespoon  vinaigrette dressing    Seven- 200 calories, 11 g protein  1 Tbsp sugar-free preserves and 1 Tbsp peanut butter on 1 slice whole grain toast    cup nonfat/lowfat Greek yogurt    Eight- 250 calories, 18 g protein  1 small soft-shell chicken taco with 1 oz shredded cheese, lettuce, tomato, salsa, and 1 Tbsp light sour cream    cup black beans    Nine- 225 calories, 13 g protein  2 ounces baked chicken  1/4 cup mashed potatoes    cup green beans    Ten- 200 calories, 21 g protein  Deli jhony: 2 oz  roast beef or other deli meat with 1 tsp Pedro mayonnaise and sliced tomato, onion, and lettuce  1/3rd cup cottage cheese      Ten Dinners Under 300 calories    If you're eating a large breakfast and medium lunch, keep dinner small.  300-400 calories is ideal for most people depending on their caloric needs.    One- 300 calories, 12 g protein  1-inch thick slice of turkey meatloaf    cup baked butternut squash    Two- 200 calories, 9 g protein  Bread-less BLT: 3 slices turkey lim, sliced tomato, wrapped in a large lettuce leaf    cup peeled fruit    Three- 275 calories, 36 g protein  3 oz roasted chicken    cup cooked broccoli    cup shredded cheddar cheese    cup unsweetened applesauce    Four- 200 calories, 25 g protein  3 oz baked tilapia  1/3rd cup cooked carrots    cup yogurt    Five- 250 calories, 20 g protein  Grilled ham  n  Swiss: spread 2 tsp ghee or butter on 1 slice of whole grain bread.  Cut bread in half, layer 2 oz deli ham with 1 piece of Swiss cheese and grill until cheese is melted.    cup cooked vegetables    Six- 250 calories, 18 g protein  Vegetarian cheeseburger: 1 [place] cheeseburger topped with lettuce, onion, tomato, and ketchup/mustard    cup sweet potato fries    Seven- 250 calories, 18 g protein  Pork pot roast: 2 oz roasted pork loin, 1/3rd cup roasted carrots,   medium potato, cooked with   cup gravy    Eight- 330 calories, 25 g protein  2 oz meatballs (about 2 small meatballs)    cup spaghetti sauce  1/2 piece toast topped with 1 tsp ghee or butterand topped with garlic powder, toasted in oven    Nine- 250 calories, 16 g protein  Mexican pizza: one 8  corn tortilla topped with 2 oz chicken,   cup salsa, 2 tablespoons black beans, 2 tablespoons shredded cheese.  Bake until cheese is melted.    Ten- 250 calories, 22 g protein  Shrimp stir-gaston: 3 oz cooked shrimp, 1/6th onion,   pepper,   cup chopped carrots sautéed in 1 tablespoon olive oil, topped with 2 tablespoons stir gaston sauce and a  pinch of sesame seeds        150 Calories or Less Snack Ideas   1 hardboiled egg with   cup berries  1 small apple with 1 hardboiled egg  10 almonds with   cup berries  2 clementines with 1 light string cheese  1 light string cheese with   sliced apple  1 light string cheese wrapped in 2 slices of turkey  4 100% whole wheat crackers (e.g. Triscuit) with 1 light string cheese    c. cottage cheese with   cup fruit and 1 Tbsp sunflower seeds     cup cottage cheese with   of an avocado     can tuna fish with 1 cup sliced cucumbers     cup roasted garbanzo beans with paprika and cayenne pepper    baked sweet potato with   cup chili beans or   cup cottage cheese  2 oz. nitrate free turkey slices with 1 cup carrots  1 container (6 oz) of low sugar (less than 10 grams of sugar) greek yogurt   3 Tablespoons of hummus with 1 cup sliced bell peppers   2 Tablespoons of hummus with 15 baby carrots  4 Tablespoons ranch dip made with plain Greek Yogurt and 3 mini cucumbers  1/4 cup nuts (any kind)  1 Tablespoon peanut butter with 1 stalk celery   1 dill pickle wrapped in 1-2 slices of deli ham with 1 tsp of mayonnaise/mustard.

## 2024-09-23 NOTE — PROGRESS NOTES
Viviana Dumont is a 33 year old who is being evaluated via a billable video visit.      How would you like to obtain your AVS? MyChart  If the video visit is dropped, the invitation should be resent by: Text to cell phone: [phone]  Will anyone else be joining your video visit? No        Medical  Weight Loss Follow-Up Diet Evaluation  Assessment:  Viviana is presenting today for a follow up weight management nutrition consultation.  This patient has had an initial appointment and was referred by Dr. Childers for MNT as treatment for Obesity   Weight loss medication: Phentermine. Wegovy.   Pt's weight is 175 lbs (6/21/2024)  Initial weight: 197.5 lbs  Weight change: 22.5 lbs, 11.4% weight loss        6/21/2024     8:55 AM   Changes and Difficulties   I have made the following changes to my diet since my last visit: Tried to eat breakfast more often & drink more water   With regards to my diet, I am still struggling with: I forget to eat some times   I have made the following changes to my activity/exercise since my last visit: Arline joined the [employer] & have been walking     BMI: 34.18  Ideal body weight: 45.5 kg (100 lb 4.9 oz)  Adjusted ideal body weight: 59.1 kg (130 lb 3 oz)    Estimated RMR ([place]-St Jeor equation):   1,410 kcals x 1.2 (sedentary) = 1,692 kcals (for weight maintenance)  1,410 kcals x 1.3 (light active) = 1,939 kcals (for weight maintenance)  Recommended Protein Intake: 60-80 grams of protein/day  Patient Active Problem List:  Patient Active Problem List   Diagnosis    Migraine Headache    Midback Pain    Fainting (Syncope)    Mood Disorder Of Unknown (Axis III) Etiology    Low back pain    Fatty liver    Dyslipidemia    Elevated liver enzymes    Headache, migraine    Chronic constipation    Metabolic syndrome    Insulin resistance    Acanthosis nigricans    Family history of diabetes mellitus type II    Symptomatic cholelithiasis    Obesity (BMI 35.0-39.9) with comorbidity (H)   Diabetes:  no A1C of 5.3% on 5/19/2024    Progress on goals from last visit: Patient stated that the first 24 hours she has issues with nausea and tiredness. Patient does not have a set routine with her oral intake - will commonly have 2 meals/day. Patient feels like she forgets to eat and looking for quick protein options. Patient does not feel like she is having a false sense of fullness from meals. Discussed how an empty stomach and increase nausea. Patient stated that she knows what she needs to do but procrastinates and does not complete it. Encouraged patient to use external reminders to help her create a routine with eating - stated that she has her boyfriend remind her to eat and will just turn off alarms on her phone if she used that.   Does not like the texture of certain foods    Dietary Recall:  Breakfast: skips often  Only eating lunch and dinner most days  Eating out: will eat out frequently during the week  Beverages:   Coffee  Pop  Water  Exercise:   Trying to create a routine with her daughter - has a gym membership  Nutrition Diagnosis:    Overweight/Obesity (NC 3.3) related to overeating and poor lifestyle habits as evidenced by patient report of no eating routine, lack of vegetable and fruit intake. and protein intake and BMI 34.18      Intervention:  Food and/or nutrient delivery: trying to create a routine with eating and exercise. Aiming to have three meals per day.   Nutrition education: High protein cereals and meal ideas    Monitoring/Evaluation:    Goals:  Aim for three meals per day  Aim to have 20+ grams of protein with meals    Patient to follow up in 4 month(s) with bariatrician and PRN with RD      Video-Visit Details    Type of service:  Video Visit    Video Start Time (time video started): 1:13 PM    Video End Time (time video stopped): 1:27 PM    Originating Location (pt. Location): Home      Distant Location (provider location):  On-site    Mode of Communication:  Video Conference via  [facility]    Physician has received verbal consent for a Video Visit from the patient? Yes      Saba Car RD

## 2025-01-03 DIAGNOSIS — E66.3 OVERWEIGHT: ICD-10-CM

## 2025-01-03 DIAGNOSIS — E88.810 METABOLIC SYNDROME: ICD-10-CM

## 2025-01-03 DIAGNOSIS — E88.819 INSULIN RESISTANCE: ICD-10-CM

## 2025-01-04 RX ORDER — PHENTERMINE HYDROCHLORIDE 37.5 MG/1
TABLET ORAL
Qty: 90 TABLET | Refills: 1 | Status: SHIPPED | OUTPATIENT
Start: 2025-01-04

## 2025-02-05 ENCOUNTER — VIRTUAL VISIT (OUTPATIENT)
Dept: SURGERY | Facility: CLINIC | Age: 35
End: 2025-02-05
Payer: COMMERCIAL

## 2025-02-05 VITALS — WEIGHT: 177 LBS | BODY MASS INDEX: 34.75 KG/M2 | HEIGHT: 60 IN

## 2025-02-05 DIAGNOSIS — E66.9 OBESITY (BMI 30-39.9): Primary | ICD-10-CM

## 2025-02-05 DIAGNOSIS — E88.810 METABOLIC SYNDROME: ICD-10-CM

## 2025-02-05 PROCEDURE — 98006 SYNCH AUDIO-VIDEO EST MOD 30: CPT | Performed by: FAMILY MEDICINE

## 2025-02-05 ASSESSMENT — PAIN SCALES - GENERAL: PAINLEVEL_OUTOF10: NO PAIN (0)

## 2025-02-05 NOTE — NURSING NOTE
Current patient location: Patient declined to provide     Is the patient currently in the state of [place]? YES    Visit mode: VIDEO    If the visit is dropped, the patient can be reconnected by:VIDEO VISIT: Send to e-mail at: [email or web address]    Will anyone else be joining the visit? NO  (If patient encounters technical issues they should call [phone])    Are changes needed to the allergy or medication list? No    Are refills needed on medications prescribed by this physician? NO    Rooming Documentation:  Questionnaire(s) completed    Reason for visit: NANCY PATELF

## 2025-02-05 NOTE — LETTER
2/5/2025      Viviana Dumont  [address]  [place] [address]      Dear Colleague,    Thank you for referring your patient, Viviana Dumont, to the [facility] BAR[facility]ATRICS [facility] [place]. Please see a copy of my visit note below.    Bariatric Follow-up    34 year old  female BMI:Body mass index is 34.57 kg/m .    Weight:   Wt Readings from Last 1 Encounters:   02/05/25 80.3 kg (177 lb)    pounds    Comorbidities:  Patient Active Problem List   Diagnosis     Migraine Headache     Midback Pain     Fainting (Syncope)     Mood Disorder Of Unknown (Axis III) Etiology     Low back pain     Fatty liver     Dyslipidemia     Elevated liver enzymes     Headache, migraine     Chronic constipation     Metabolic syndrome     Insulin resistance     Acanthosis nigricans     Family history of diabetes mellitus type II     Symptomatic cholelithiasis     Obesity (BMI 35.0-39.9) with comorbidity (H)         Current Outpatient Medications:      tirzepatide-Weight Management (ZEPBOUND) 2.5 MG/0.5ML prefilled pen, Inject 0.5 mLs (2.5 mg) subcutaneously every 7 days for 28 days., Disp: 2 mL, Rfl: 0     aspirin-acetaminophen-caffeine (EXCEDRIN MIGRAINE) 250-250-65 mg per tablet, [ASPIRIN-ACETAMINOPHEN-CAFFEINE (EXCEDRIN MIGRAINE) 250-250-65 MG PER TABLET] Take 1 tablet by mouth every 6 (six) hours as needed for pain., Disp: , Rfl:      phentermine (ADIPEX-P) 37.5 MG tablet, [PHENTERMINE (ADIPEX-P) 37.5 MG TABLET] Take 1/2 to 1 tablet in the morning., Disp: 90 tablet, Rfl: 1       Interim: Stress is high. Stopped working in December. Looking for a job. Has had freedom with kids so would like to have that still. Tried Wegovy for 3 months Did not like the side effects so did not continue. Taking only phentermine. Trying to eat more chicken and fish and veggies.     Cholesterol   Date Value Ref Range Status   02/28/2014 226 (H) <200 mg/dL Final      Hemoglobin A1C   Date Value Ref Range Status    11/28/2017 5.3 4.2 - 6.1 % Final      BP Readings from Last 3 Encounters:   03/14/23 126/72   09/15/22 104/70        Plan:  DIET  RD for MNT. Work on meal planning to decrease eating out.    [facility]   PHARMACOTHERAPY continue phentermine and try Zepbound. Has Zofran at home.     We discussed the importance of hydration, staying ahead of potential constipation, and consuming 3 protein containing, nutrient dense meals while taking GLP-1 RA medications.       -We reviewed her medications and whether associated with weight gain.         We discussed [facility] Bariatric Basics including:  -eating 3 meals daily  -eating protein first  -eating slowly, chewing food well  -avoiding/limiting calorie containing beverages  -choosing wheat, not white with breads, crackers, pastas, jhony, bagels, tortillas, rice  -limiting restaurant or cafeteria eating to twice a week or less  -We discussed the importance of restorative sleep and stress management in maintaining a healthy weight.  -We discussed insulin resistance and glycemic index as it relates to appetite and weight control  -We discussed the National Weight Control Registry healthy weight maintenance strategies and ways to optimize metabolism.  -We discussed the importance of physical activity including cardiovascular and strength training in maintaining a healthier weight and explored viable options.      DIETARY HISTORY  Meals Per Day: 2  Eating Protein First?: sometimes  Food Diary: B:skips or eggs or hot dogs or lim sandwich with bagel L:chicken, more veggies  D:chipoltle  Snacks Per Day: fruit veggies   Typical Snack: see above  Fluid Intake: water   Portion Control: stable  Calorie Containing Beverages: soda and juice  Alcohol per week: no  Typical Protein Food Choices: more chicken and fish  Choosing Whole Grains: yes  Grocery Shopping is done by: herself  Food Preparation is done by: herself  Meals at Restaurant/Cafeteria/Take  Out Per Week: 3  Eating at the Table:   TV is Off During Meals:     Positive Changes Since Last Visit: more chicken and fish  Struggling With: exercise -joined    Knowledgeable in Reading Food Labels:   Getting Adequate Protein: yes   Sleeping 7-8 hours/day no  Stress management improve support    PHYSICAL ACTIVITY PATTERNS:  Cardiovascular: joined the y  Strength Training: joined the [facility]      PHYSICAL EXAM: (most recent vitals and today's stated weight)  Vitals: Ht 1.524 m (5')   Wt 80.3 kg (177 lb)   BMI 34.57 kg/m        GEN: Pleasant and in no acute distress  PSYCH: A&OX3,     I have reviewed the note as documented above.  This accurately captures the substance of my conversation with the patient.  Thank you for the opportunity to participate in the care of your patient.    Martine Childers MD, FAAFP  [facility]  Diplomate, American Board of Obesity Medicine    Total time spent on the date of this encounter doing: chart review, review of test results, patient visit, physical exam, education, counseling, developing plan of care, and documenting = 30 minutes.    Virtual Visit Details    Type of service:  Video Visit   Video Start Time:  10:15  Video End Time: 10:45    Originating Location (pt. Location): Home    Distant Location (provider location):  On-site  Platform used for Video Visit: AmWell      Again, thank you for allowing me to participate in the care of your patient.        Sincerely,        Martine Childers MD    Electronically signed

## 2025-02-05 NOTE — PROGRESS NOTES
Bariatric Follow-up    34 year old  female BMI:Body mass index is 34.57 kg/m .    Weight:   Wt Readings from Last 1 Encounters:   02/05/25 80.3 kg (177 lb)    pounds    Comorbidities:  Patient Active Problem List   Diagnosis    Migraine Headache    Midback Pain    Fainting (Syncope)    Mood Disorder Of Unknown (Axis III) Etiology    Low back pain    Fatty liver    Dyslipidemia    Elevated liver enzymes    Headache, migraine    Chronic constipation    Metabolic syndrome    Insulin resistance    Acanthosis nigricans    Family history of diabetes mellitus type II    Symptomatic cholelithiasis    Obesity (BMI 35.0-39.9) with comorbidity (H)         Current Outpatient Medications:     tirzepatide-Weight Management (ZEPBOUND) 2.5 MG/0.5ML prefilled pen, Inject 0.5 mLs (2.5 mg) subcutaneously every 7 days for 28 days., Disp: 2 mL, Rfl: 0    aspirin-acetaminophen-caffeine (EXCEDRIN MIGRAINE) 250-250-65 mg per tablet, [ASPIRIN-ACETAMINOPHEN-CAFFEINE (EXCEDRIN MIGRAINE) 250-250-65 MG PER TABLET] Take 1 tablet by mouth every 6 (six) hours as needed for pain., Disp: , Rfl:     phentermine (ADIPEX-P) 37.5 MG tablet, [PHENTERMINE (ADIPEX-P) 37.5 MG TABLET] Take 1/2 to 1 tablet in the morning., Disp: 90 tablet, Rfl: 1       Interim: Stress is high. Stopped working in December. Looking for a job. Has had freedom with kids so would like to have that still. Tried Wegovy for 3 months Did not like the side effects so did not continue. Taking only phentermine. Trying to eat more chicken and fish and veggies.     Cholesterol   Date Value Ref Range Status   02/28/2014 226 (H) <200 mg/dL Final      Hemoglobin A1C   Date Value Ref Range Status   11/28/2017 5.3 4.2 - 6.1 % Final      BP Readings from Last 3 Encounters:   03/14/23 126/72   09/15/22 104/70        Plan:  DIET  RD for MNT. Work on meal planning to decrease eating out.    [facility]   PHARMACOTHERAPY continue phentermine and try Zepbound. Has Zofran at  home.     We discussed the importance of hydration, staying ahead of potential constipation, and consuming 3 protein containing, nutrient dense meals while taking GLP-1 RA medications.       -We reviewed her medications and whether associated with weight gain.         We discussed [facility] Bariatric Basics including:  -eating 3 meals daily  -eating protein first  -eating slowly, chewing food well  -avoiding/limiting calorie containing beverages  -choosing wheat, not white with breads, crackers, pastas, jhony, bagels, tortillas, rice  -limiting restaurant or cafeteria eating to twice a week or less  -We discussed the importance of restorative sleep and stress management in maintaining a healthy weight.  -We discussed insulin resistance and glycemic index as it relates to appetite and weight control  -We discussed the National Weight Control Registry healthy weight maintenance strategies and ways to optimize metabolism.  -We discussed the importance of physical activity including cardiovascular and strength training in maintaining a healthier weight and explored viable options.      DIETARY HISTORY  Meals Per Day: 2  Eating Protein First?: sometimes  Food Diary: B:skips or eggs or hot dogs or lim sandwich with bagel L:chicken, more veggies  D:chipoltle  Snacks Per Day: fruit veggies   Typical Snack: see above  Fluid Intake: water   Portion Control: stable  Calorie Containing Beverages: soda and juice  Alcohol per week: no  Typical Protein Food Choices: more chicken and fish  Choosing Whole Grains: yes  Grocery Shopping is done by: herself  Food Preparation is done by: herself  Meals at Restaurant/Cafeteria/Take Out Per Week: 3  Eating at the Table:   TV is Off During Meals:     Positive Changes Since Last Visit: more chicken and fish  Struggling With: exercise -joined    Knowledgeable in Reading Food Labels:   Getting Adequate Protein: yes   Sleeping 7-8 hours/day no  Stress management improve  support    PHYSICAL ACTIVITY PATTERNS:  Cardiovascular: joined the y  Strength Training: joined the [facility]      PHYSICAL EXAM: (most recent vitals and today's stated weight)  Vitals: Ht 1.524 m (5')   Wt 80.3 kg (177 lb)   BMI 34.57 kg/m        GEN: Pleasant and in no acute distress  PSYCH: A&OX3,     I have reviewed the note as documented above.  This accurately captures the substance of my conversation with the patient.  Thank you for the opportunity to participate in the care of your patient.    Martine Childers MD, FAAFP  [facility]  Diplomate, American Board of Obesity Medicine    Total time spent on the date of this encounter doing: chart review, review of test results, patient visit, physical exam, education, counseling, developing plan of care, and documenting = 30 minutes.    Virtual Visit Details    Type of service:  Video Visit   Video Start Time:  10:15  Video End Time: 10:45    Originating Location (pt. Location): Home    Distant Location (provider location):  On-site  Platform used for Video Visit: CoreyWell

## 2025-02-07 ENCOUNTER — TELEPHONE (OUTPATIENT)
Dept: SURGERY | Facility: CLINIC | Age: 35
End: 2025-02-07
Payer: COMMERCIAL

## 2025-02-07 NOTE — TELEPHONE ENCOUNTER
Prior Authorization Retail Medication Request    Medication/Dose: Zepbound 2.5mg/0.5ml Auto-injectors.  New/renewal/insurance change PA/secondary ins. PA:  Previously Tried and Failed:  Wegovy--side effects. Phentermine    Key: [id number]    Pharmacy Information (if different than what is on RX)  Name:  [id number] - [place], [place]   Phone:  [phone]   Fax:  [phone]     Clinic Information  Preferred routing pool for dept communication: Bariatric Surgery Support [facility]

## 2025-02-13 NOTE — TELEPHONE ENCOUNTER
PA Initiation    Medication: ZEPBOUND 2.5 MG/0.5ML SC SOAJ  Insurance Company: [employer] - Phone [phone] Fax [phone]  Pharmacy Filling the Rx: [id number] - [place], [place] - [address]  Filling Pharmacy Phone: [phone]  Filling Pharmacy Fax: [phone]  Start Date: 2/12/2025

## 2025-02-17 NOTE — TELEPHONE ENCOUNTER
PRIOR AUTHORIZATION DENIED    Medication: ZEPBOUND 2.5 MG/0.5ML SC SOAJ  Insurance Company: [employer] - Phone [phone] Fax [phone]  Denial Date: 2/13/2025  Denial Reason(s):       Appeal Information:       Patient Notified: NO

## 2025-02-27 NOTE — TELEPHONE ENCOUNTER
Medication Appeal Initiation    Medication: ZEPBOUND 2.5 MG/0.5ML SC SOAJ  Appeal Start Date:  2/27/2025  Insurance Company: [employer]  Insurance Phone: [phone]   Insurance Fax: [phone]  Comments:       INITIATED VIA RIGHTFAX

## 2025-03-04 NOTE — TELEPHONE ENCOUNTER
MEDICATION APPEAL APPROVED    Medication: ZEPBOUND 2.5 MG/0.5ML SC SOAJ  Authorization Effective Date: 1/27/2025  Authorization Expiration Date: 2/27/2026  Approved Dose/Quantity:       Reference #:     Appeal Insurance Company: [employer]  Expected CoPay: $       CoPay Card Available:    Financial Assistance Needed:   Filling Pharmacy: [id number] [address]  Patient Notified: Instructed pharmacy to notify patient once order is ready.     Comments:

## 2025-07-19 ENCOUNTER — HEALTH MAINTENANCE LETTER (OUTPATIENT)
Age: 35
End: 2025-07-19